# Patient Record
Sex: FEMALE | Race: WHITE | Employment: OTHER | URBAN - METROPOLITAN AREA
[De-identification: names, ages, dates, MRNs, and addresses within clinical notes are randomized per-mention and may not be internally consistent; named-entity substitution may affect disease eponyms.]

---

## 2019-09-26 ENCOUNTER — OFFICE VISIT (OUTPATIENT)
Dept: FAMILY MEDICINE CLINIC | Age: 74
End: 2019-09-26
Payer: COMMERCIAL

## 2019-09-26 VITALS
WEIGHT: 174 LBS | TEMPERATURE: 97.5 F | HEART RATE: 75 BPM | SYSTOLIC BLOOD PRESSURE: 132 MMHG | OXYGEN SATURATION: 96 % | BODY MASS INDEX: 28.99 KG/M2 | HEIGHT: 65 IN | DIASTOLIC BLOOD PRESSURE: 78 MMHG

## 2019-09-26 DIAGNOSIS — M65.30 TRIGGER FINGER OF RIGHT HAND, UNSPECIFIED FINGER: ICD-10-CM

## 2019-09-26 DIAGNOSIS — R42 LIGHTHEADEDNESS: ICD-10-CM

## 2019-09-26 DIAGNOSIS — Z00.00 PREVENTATIVE HEALTH CARE: ICD-10-CM

## 2019-09-26 DIAGNOSIS — J45.909 ASTHMA DUE TO SEASONAL ALLERGIES: ICD-10-CM

## 2019-09-26 DIAGNOSIS — R20.2 NUMBNESS AND TINGLING IN BOTH HANDS: Primary | ICD-10-CM

## 2019-09-26 DIAGNOSIS — R20.0 NUMBNESS AND TINGLING IN BOTH HANDS: Primary | ICD-10-CM

## 2019-09-26 DIAGNOSIS — M79.631 RIGHT FOREARM PAIN: ICD-10-CM

## 2019-09-26 PROCEDURE — 99204 OFFICE O/P NEW MOD 45 MIN: CPT | Performed by: NURSE PRACTITIONER

## 2019-09-26 RX ORDER — LORATADINE 10 MG/1
10 TABLET ORAL DAILY PRN
COMMUNITY

## 2019-09-26 RX ORDER — COVID-19 ANTIGEN TEST
220 KIT MISCELLANEOUS
COMMUNITY
End: 2019-09-26 | Stop reason: ALTCHOICE

## 2019-09-26 RX ORDER — BIOTIN 1 MG
1000 TABLET ORAL DAILY
COMMUNITY

## 2019-09-26 ASSESSMENT — PATIENT HEALTH QUESTIONNAIRE - PHQ9
2. FEELING DOWN, DEPRESSED OR HOPELESS: 0
1. LITTLE INTEREST OR PLEASURE IN DOING THINGS: 0
SUM OF ALL RESPONSES TO PHQ9 QUESTIONS 1 & 2: 0
SUM OF ALL RESPONSES TO PHQ QUESTIONS 1-9: 0
SUM OF ALL RESPONSES TO PHQ QUESTIONS 1-9: 0

## 2019-09-26 ASSESSMENT — ENCOUNTER SYMPTOMS
WHEEZING: 0
SHORTNESS OF BREATH: 0
COUGH: 1

## 2019-09-26 NOTE — PROGRESS NOTES
Oxana Mao. Prince Silva, APRN-CNP  [address] [facility]  [address] Lizeth Conquest  [address] [address]  Dept: [phone]  Dept Fax: [phone]    HPI:   Karl Medley is a 75 y.o. female who presents today for her medical conditions/complaintsas noted below. Karl Medley is c/o of New Patient; Numbness (Pt c/o rt hand index and middle finger pins and needle sensation with numbness. ); Dizziness (Pt states x 2 years when she gets hot she feels light-headed. ); and Other (Orthostatics LAY-144/76 SIT-142/80 CETMM-665/53)    HPI  Patient presents today to establish care with a new primary care provider. Previous primary care provider: Dr. Kelly Mendoza. Date last evaluated by former primary care provider: About a month ago. Need to request previous records:    Review of medical hx/current concerns:    HTN, lightheadedness: Was previously on medication- resolved with diet and exercise. She has noted when she is walking and is hot- then stops, she feels like she could pass out- first episode was 2 years ago. She does get dizzy when this occurs- she does feel that she could be dehydrated. Denies chest pain, palpitations, SOB. Dizziness episodes last for about one minute. Does happen frequently when she visits her son in [place]. Last dizziness episode was 1 week ago. States that her right index and middle finger (constant), as well as thumb is numb/tingling (comes and goes)- has had for many years. 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 Symptoms worsen when she is writing books. She does have a stabbing pain to right forearm, has noted over the past 1.5 months. Pain is improving, but still painful- has been picking up 3year old frequently. Pain does go down right wrist. Sometimes has s/s to left hand, but not as bad (right handed).      Past Medical History:   Diagnosis Date    Arthritis     Hypertension       Past Surgical History:   Procedure Value Date    WBC 4.1 07/11/2016    RBC 4.70 07/11/2016    HGB 13.4 07/11/2016    HCT 39.6 07/11/2016    MCV 84.4 07/11/2016    MCH 28.5 07/11/2016    MCHC 33.8 07/11/2016    RDW 14.8 07/11/2016     07/11/2016    MPV 8.7 07/11/2016     Lab Results   Component Value Date    TSH 2.18 09/20/2014     Lab Results   Component Value Date    CHOL 246 07/11/2016    HDL 66 07/11/2016     Assessment & Plan:      1. Numbness and tingling in both hands  -Suspicious for CTS, patient states she tried braces in the past which made s/s worse  -Labs and testing as follows:  - XR HAND RIGHT (MIN 3 VIEWS); Future  - XR RADIUS ULNA RIGHT (2 VIEWS); Future  - EMG; Future  - Vitamin B12; Future  -NSAID OTC PRN, patient declines rx pain medication  -Start stretches as provided in AVS    2. Trigger finger of right hand, unspecified finger  -Check X-ray  -Offered referral to hand specialist, patient declined at this time  - XR HAND RIGHT (MIN 3 VIEWS); Future    3. Right forearm pain  -Check x-ray  - XR RADIUS ULNA RIGHT (2 VIEWS); Future    4. Lightheadedness  -Orthostatics slightly remarkable  -Stay well hydrated  -Do not drive if dizzy  -Seek emergent tx for severe dizziness at any time  -Labs and testing as follows:  - CBC; Future  - Comprehensive Metabolic Panel; Future  - TSH with Reflex; Future  - EKG 12 Lead; Future  - Echocardiogram complete; Future  - Holter Monitor 48 Hour; Future  - US CAROTID ARTERY BILATERAL; Future    5. Asthma due to seasonal allergies  -Stable: Con't all medications as ordered, con't current tx plan- uses albuterol inhaler very rarely d/t cough    6. Preventative health care  -Labs as follows:  - CBC; Future  - Comprehensive Metabolic Panel; Future  - Hemoglobin A1C; Future  - Lipid Panel; Future    Return in about 1 month (around 10/26/2019) for lightheadedness, numbness/tingling. Discussed use, benefit, and side effects of prescribed medications. Barriers to medication compliance addressed.  All

## 2019-09-26 NOTE — PATIENT INSTRUCTIONS
[email or web address] and sign in to your [employer] account. Enter B278 in the [facility] box to learn more about \"Carpal Tunnel Syndrome: Exercises. \"     If you do not have an account, please click on the \"Sign Up Now\" link. Current as of: September 20, 2018  Content Version: 12.1  © 4554-9801 Healthwise, Incorporated. Care instructions adapted under license by [facility] ([facility]). If you have questions about a medical condition or this instruction, always ask your healthcare professional. [address] any warranty or liability for your use of this information.

## 2019-10-01 ENCOUNTER — HOSPITAL ENCOUNTER (OUTPATIENT)
Age: 74
Discharge: HOME | End: 2019-10-03
Payer: MEDICARE

## 2019-10-01 ENCOUNTER — HOSPITAL ENCOUNTER (OUTPATIENT)
Dept: GENERAL RADIOLOGY | Age: 74
Discharge: HOME | End: 2019-10-03
Payer: MEDICARE

## 2019-10-01 ENCOUNTER — HOSPITAL ENCOUNTER (OUTPATIENT)
Age: 74
Discharge: HOME | End: 2019-10-01
Payer: MEDICARE

## 2019-10-01 DIAGNOSIS — R42 LIGHTHEADEDNESS: ICD-10-CM

## 2019-10-01 DIAGNOSIS — R20.2 NUMBNESS AND TINGLING IN BOTH HANDS: ICD-10-CM

## 2019-10-01 DIAGNOSIS — R20.0 NUMBNESS AND TINGLING IN BOTH HANDS: ICD-10-CM

## 2019-10-01 DIAGNOSIS — M79.631 RIGHT FOREARM PAIN: ICD-10-CM

## 2019-10-01 DIAGNOSIS — Z00.00 PREVENTATIVE HEALTH CARE: ICD-10-CM

## 2019-10-01 DIAGNOSIS — M65.30 TRIGGER FINGER OF RIGHT HAND, UNSPECIFIED FINGER: ICD-10-CM

## 2019-10-01 LAB
ALBUMIN SERPL-MCNC: 4.2 G/DL (ref 3.5–5.2)
ALBUMIN/GLOBULIN RATIO: 1.4 (ref 1–2.5)
ALP BLD-CCNC: 82 U/L (ref 35–104)
ALT SERPL-CCNC: 18 U/L (ref 5–33)
ANION GAP SERPL CALCULATED.3IONS-SCNC: 12 MMOL/L (ref 9–17)
AST SERPL-CCNC: 18 U/L
BILIRUB SERPL-MCNC: 0.65 MG/DL (ref 0.3–1.2)
BUN BLDV-MCNC: 18 MG/DL (ref 8–23)
BUN/CREAT BLD: ABNORMAL (ref 9–20)
CALCIUM SERPL-MCNC: 9.4 MG/DL (ref 8.6–10.4)
CHLORIDE BLD-SCNC: 106 MMOL/L (ref 98–107)
CHOLESTEROL/HDL RATIO: 3.3
CHOLESTEROL: 222 MG/DL
CO2: 24 MMOL/L (ref 20–31)
CREAT SERPL-MCNC: 0.73 MG/DL (ref 0.5–0.9)
EKG ATRIAL RATE: 65 BPM
EKG P AXIS: 60 DEGREES
EKG P-R INTERVAL: 176 MS
EKG Q-T INTERVAL: 424 MS
EKG QRS DURATION: 98 MS
EKG QTC CALCULATION (BAZETT): 440 MS
EKG R AXIS: -5 DEGREES
EKG T AXIS: 23 DEGREES
EKG VENTRICULAR RATE: 65 BPM
ESTIMATED AVERAGE GLUCOSE: 111 MG/DL
GFR AFRICAN AMERICAN: >60 ML/MIN
GFR NON-AFRICAN AMERICAN: >60 ML/MIN
GFR SERPL CREATININE-BSD FRML MDRD: ABNORMAL ML/MIN/{1.73_M2}
GFR SERPL CREATININE-BSD FRML MDRD: ABNORMAL ML/MIN/{1.73_M2}
GLUCOSE BLD-MCNC: 106 MG/DL (ref 70–99)
HBA1C MFR BLD: 5.5 % (ref 4–6)
HCT VFR BLD CALC: 43 % (ref 36.3–47.1)
HDLC SERPL-MCNC: 68 MG/DL
HEMOGLOBIN: 14 G/DL (ref 11.9–15.1)
LDL CHOLESTEROL: 126 MG/DL (ref 0–130)
MCH RBC QN AUTO: 28.8 PG (ref 25.2–33.5)
MCHC RBC AUTO-ENTMCNC: 32.6 G/DL (ref 28.4–34.8)
MCV RBC AUTO: 88.5 FL (ref 82.6–102.9)
NRBC AUTOMATED: 0 PER 100 WBC
PDW BLD-RTO: 14.2 % (ref 11.8–14.4)
PLATELET # BLD: 214 K/UL (ref 138–453)
PMV BLD AUTO: 10.9 FL (ref 8.1–13.5)
POTASSIUM SERPL-SCNC: 4.2 MMOL/L (ref 3.7–5.3)
RBC # BLD: 4.86 M/UL (ref 3.95–5.11)
SODIUM BLD-SCNC: 142 MMOL/L (ref 135–144)
TOTAL PROTEIN: 7.2 G/DL (ref 6.4–8.3)
TRIGL SERPL-MCNC: 142 MG/DL
TSH SERPL DL<=0.05 MIU/L-ACNC: 2.98 MIU/L (ref 0.3–5)
VITAMIN B-12: 400 PG/ML (ref 232–1245)
VLDLC SERPL CALC-MCNC: ABNORMAL MG/DL (ref 1–30)
WBC # BLD: 4.4 K/UL (ref 3.5–11.3)

## 2019-10-01 PROCEDURE — 73130 X-RAY EXAM OF HAND: CPT

## 2019-10-01 PROCEDURE — 85027 COMPLETE CBC AUTOMATED: CPT

## 2019-10-01 PROCEDURE — 84443 ASSAY THYROID STIM HORMONE: CPT

## 2019-10-01 PROCEDURE — 82607 VITAMIN B-12: CPT

## 2019-10-01 PROCEDURE — 93005 ELECTROCARDIOGRAM TRACING: CPT | Performed by: NURSE PRACTITIONER

## 2019-10-01 PROCEDURE — 80053 COMPREHEN METABOLIC PANEL: CPT

## 2019-10-01 PROCEDURE — 73090 X-RAY EXAM OF FOREARM: CPT

## 2019-10-01 PROCEDURE — 83036 HEMOGLOBIN GLYCOSYLATED A1C: CPT

## 2019-10-01 PROCEDURE — 80061 LIPID PANEL: CPT

## 2019-10-01 PROCEDURE — 36415 COLL VENOUS BLD VENIPUNCTURE: CPT

## 2019-10-17 ENCOUNTER — HOSPITAL ENCOUNTER (OUTPATIENT)
Dept: NON INVASIVE DIAGNOSTICS | Age: 74
Discharge: HOME | End: 2019-10-17
Payer: MEDICARE

## 2019-10-17 ENCOUNTER — HOSPITAL ENCOUNTER (OUTPATIENT)
Dept: VASCULAR LAB | Age: 74
Discharge: HOME | End: 2019-10-17
Payer: MEDICARE

## 2019-10-17 DIAGNOSIS — R42 LIGHTHEADEDNESS: ICD-10-CM

## 2019-10-17 DIAGNOSIS — R00.0 TACHYCARDIA: Primary | ICD-10-CM

## 2019-10-17 LAB
LV EF: 65 %
LVEF MODALITY: NORMAL

## 2019-10-17 PROCEDURE — 93880 EXTRACRANIAL BILAT STUDY: CPT

## 2019-10-17 PROCEDURE — 93226 XTRNL ECG REC<48 HR SCAN A/R: CPT

## 2019-10-17 PROCEDURE — 93225 XTRNL ECG REC<48 HRS REC: CPT

## 2019-10-17 PROCEDURE — 93306 TTE W/DOPPLER COMPLETE: CPT

## 2019-10-18 PROBLEM — I65.23 BILATERAL CAROTID ARTERY STENOSIS: Status: ACTIVE | Noted: 2019-10-18

## 2019-10-24 LAB
ACQUISITION DURATION: NORMAL S
AVERAGE HEART RATE: 74 BPM
HOOKUP DATE: NORMAL
HOOKUP TIME: NORMAL
MAX HEART RATE TIME/DATE: NORMAL
MAX HEART RATE: 111 BPM
MIN HEART RATE TIME/DATE: NORMAL
MIN HEART RATE: 51 BPM
NUMBER OF QRS COMPLEXES: NORMAL
NUMBER OF SUPRAVENTRICULAR BEATS IN RUNS: 0
NUMBER OF SUPRAVENTRICULAR COUPLETS: 0
NUMBER OF SUPRAVENTRICULAR ECTOPICS: 366
NUMBER OF SUPRAVENTRICULAR ISOLATED BEATS: 366
NUMBER OF SUPRAVENTRICULAR RUNS: 0
NUMBER OF VENTRICULAR BEATS IN RUNS: 0
NUMBER OF VENTRICULAR BIGEMINAL CYCLES: 0
NUMBER OF VENTRICULAR COUPLETS: 0
NUMBER OF VENTRICULAR ECTOPICS: 4
NUMBER OF VENTRICULAR ISOLATED BEATS: 4
NUMBER OF VENTRICULAR RUNS: 0

## 2019-11-15 ENCOUNTER — HOSPITAL ENCOUNTER (OUTPATIENT)
Age: 74
Discharge: HOME | End: 2019-11-17
Payer: MEDICARE

## 2019-11-15 ENCOUNTER — HOSPITAL ENCOUNTER (OUTPATIENT)
Dept: GENERAL RADIOLOGY | Age: 74
Discharge: HOME | End: 2019-11-17
Payer: MEDICARE

## 2019-11-15 ENCOUNTER — OFFICE VISIT (OUTPATIENT)
Dept: FAMILY MEDICINE CLINIC | Age: 74
End: 2019-11-15
Payer: MEDICARE

## 2019-11-15 VITALS
RESPIRATION RATE: 16 BRPM | BODY MASS INDEX: 29.01 KG/M2 | WEIGHT: 173 LBS | SYSTOLIC BLOOD PRESSURE: 136 MMHG | OXYGEN SATURATION: 95 % | TEMPERATURE: 97.2 F | HEART RATE: 82 BPM | DIASTOLIC BLOOD PRESSURE: 80 MMHG

## 2019-11-15 DIAGNOSIS — J01.90 ACUTE BACTERIAL SINUSITIS: ICD-10-CM

## 2019-11-15 DIAGNOSIS — B96.89 ACUTE BACTERIAL SINUSITIS: ICD-10-CM

## 2019-11-15 DIAGNOSIS — J20.9 ACUTE BRONCHITIS, UNSPECIFIED ORGANISM: Primary | ICD-10-CM

## 2019-11-15 DIAGNOSIS — J20.9 ACUTE BRONCHITIS, UNSPECIFIED ORGANISM: ICD-10-CM

## 2019-11-15 PROCEDURE — 71046 X-RAY EXAM CHEST 2 VIEWS: CPT

## 2019-11-15 PROCEDURE — 99214 OFFICE O/P EST MOD 30 MIN: CPT | Performed by: NURSE PRACTITIONER

## 2019-11-15 RX ORDER — PREDNISONE 10 MG/1
TABLET ORAL
Qty: 18 TABLET | Refills: 0 | Status: SHIPPED | OUTPATIENT
Start: 2019-11-15 | End: 2019-11-24

## 2019-11-15 RX ORDER — BENZONATATE 100 MG/1
100 CAPSULE ORAL 3 TIMES DAILY PRN
Qty: 30 CAPSULE | Refills: 0 | Status: SHIPPED | OUTPATIENT
Start: 2019-11-15 | End: 2019-12-04 | Stop reason: SDUPTHER

## 2019-11-15 RX ORDER — AZITHROMYCIN 250 MG/1
TABLET, FILM COATED ORAL
Qty: 6 TABLET | Refills: 0 | Status: SHIPPED | OUTPATIENT
Start: 2019-11-15 | End: 2019-11-19

## 2019-11-15 ASSESSMENT — ENCOUNTER SYMPTOMS
SORE THROAT: 0
RHINORRHEA: 1
SINUS PRESSURE: 1
WHEEZING: 1
SHORTNESS OF BREATH: 1
COUGH: 1
SINUS PAIN: 1

## 2019-12-04 ENCOUNTER — OFFICE VISIT (OUTPATIENT)
Dept: FAMILY MEDICINE CLINIC | Age: 74
End: 2019-12-04
Payer: MEDICARE

## 2019-12-04 VITALS
TEMPERATURE: 98.3 F | DIASTOLIC BLOOD PRESSURE: 74 MMHG | HEART RATE: 84 BPM | SYSTOLIC BLOOD PRESSURE: 138 MMHG | BODY MASS INDEX: 29.35 KG/M2 | OXYGEN SATURATION: 95 % | RESPIRATION RATE: 16 BRPM | WEIGHT: 175 LBS

## 2019-12-04 DIAGNOSIS — R42 LIGHTHEADEDNESS: ICD-10-CM

## 2019-12-04 DIAGNOSIS — F43.21 SITUATIONAL DEPRESSION: ICD-10-CM

## 2019-12-04 DIAGNOSIS — K92.1 BLOOD IN STOOL: ICD-10-CM

## 2019-12-04 DIAGNOSIS — R20.0 NUMBNESS AND TINGLING IN BOTH HANDS: ICD-10-CM

## 2019-12-04 DIAGNOSIS — M79.631 RIGHT FOREARM PAIN: ICD-10-CM

## 2019-12-04 DIAGNOSIS — J45.909 ASTHMA DUE TO SEASONAL ALLERGIES: ICD-10-CM

## 2019-12-04 DIAGNOSIS — G47.30 SLEEP APNEA, UNSPECIFIED TYPE: ICD-10-CM

## 2019-12-04 DIAGNOSIS — R20.2 NUMBNESS AND TINGLING IN BOTH HANDS: ICD-10-CM

## 2019-12-04 DIAGNOSIS — M65.359 TRIGGER LITTLE FINGER, UNSPECIFIED LATERALITY: Primary | ICD-10-CM

## 2019-12-04 DIAGNOSIS — R05.3 PERSISTENT COUGH: ICD-10-CM

## 2019-12-04 DIAGNOSIS — I51.7 LVH (LEFT VENTRICULAR HYPERTROPHY): ICD-10-CM

## 2019-12-04 DIAGNOSIS — R15.9 INCONTINENCE OF FECES, UNSPECIFIED FECAL INCONTINENCE TYPE: ICD-10-CM

## 2019-12-04 PROCEDURE — 99214 OFFICE O/P EST MOD 30 MIN: CPT | Performed by: NURSE PRACTITIONER

## 2019-12-04 RX ORDER — BENZONATATE 100 MG/1
100 CAPSULE ORAL 3 TIMES DAILY PRN
Qty: 30 CAPSULE | Refills: 0 | Status: SHIPPED | OUTPATIENT
Start: 2019-12-04 | End: 2019-12-14

## 2019-12-04 ASSESSMENT — ENCOUNTER SYMPTOMS
WHEEZING: 0
SINUS PAIN: 0
SINUS PRESSURE: 0
SORE THROAT: 0
SHORTNESS OF BREATH: 0
RECTAL PAIN: 0
RHINORRHEA: 1
COUGH: 1
ABDOMINAL PAIN: 0
BLOOD IN STOOL: 1

## 2019-12-05 ENCOUNTER — TELEPHONE (OUTPATIENT)
Dept: FAMILY MEDICINE CLINIC | Age: 74
End: 2019-12-05

## 2019-12-11 ENCOUNTER — TELEPHONE (OUTPATIENT)
Dept: GASTROENTEROLOGY | Age: 74
End: 2019-12-11

## 2019-12-13 ENCOUNTER — HOSPITAL ENCOUNTER (OUTPATIENT)
Dept: GENERAL RADIOLOGY | Age: 74
Discharge: HOME | End: 2019-12-15
Payer: MEDICARE

## 2019-12-13 ENCOUNTER — HOSPITAL ENCOUNTER (OUTPATIENT)
Age: 74
Discharge: HOME | End: 2019-12-15
Payer: MEDICARE

## 2019-12-13 DIAGNOSIS — R15.9 INCONTINENCE OF FECES, UNSPECIFIED FECAL INCONTINENCE TYPE: ICD-10-CM

## 2019-12-13 DIAGNOSIS — R05.3 PERSISTENT COUGH: ICD-10-CM

## 2019-12-13 DIAGNOSIS — J45.909 ASTHMA DUE TO SEASONAL ALLERGIES: ICD-10-CM

## 2019-12-13 PROBLEM — M51.36 LUMBAR DEGENERATIVE DISC DISEASE: Status: ACTIVE | Noted: 2019-12-13

## 2019-12-13 PROBLEM — M51.369 LUMBAR DEGENERATIVE DISC DISEASE: Status: ACTIVE | Noted: 2019-12-13

## 2019-12-13 PROCEDURE — 72100 X-RAY EXAM L-S SPINE 2/3 VWS: CPT

## 2020-01-08 ENCOUNTER — HOSPITAL ENCOUNTER (OUTPATIENT)
Age: 75
Discharge: HOME | End: 2020-01-08
Payer: MEDICARE

## 2020-01-08 ENCOUNTER — OFFICE VISIT (OUTPATIENT)
Dept: FAMILY MEDICINE CLINIC | Age: 75
End: 2020-01-08
Payer: MEDICARE

## 2020-01-08 VITALS
HEIGHT: 64 IN | RESPIRATION RATE: 16 BRPM | DIASTOLIC BLOOD PRESSURE: 70 MMHG | WEIGHT: 176 LBS | SYSTOLIC BLOOD PRESSURE: 138 MMHG | TEMPERATURE: 97 F | OXYGEN SATURATION: 95 % | HEART RATE: 73 BPM | BODY MASS INDEX: 30.05 KG/M2

## 2020-01-08 PROBLEM — M25.552 LEFT HIP PAIN: Status: ACTIVE | Noted: 2020-01-08

## 2020-01-08 PROBLEM — Z80.49 FAMILY HISTORY OF MALIGNANT NEOPLASM OF ENDOMETRIUM: Status: ACTIVE | Noted: 2020-01-08

## 2020-01-08 PROBLEM — E78.5 HYPERLIPIDEMIA: Status: ACTIVE | Noted: 2020-01-08

## 2020-01-08 LAB
CHOLESTEROL/HDL RATIO: 3.2
CHOLESTEROL: 213 MG/DL
HCT VFR BLD CALC: 43.5 % (ref 36.3–47.1)
HDLC SERPL-MCNC: 66 MG/DL
HEMOGLOBIN: 13.9 G/DL (ref 11.9–15.1)
LDL CHOLESTEROL: 127 MG/DL (ref 0–130)
MCH RBC QN AUTO: 28.5 PG (ref 25.2–33.5)
MCHC RBC AUTO-ENTMCNC: 32 G/DL (ref 28.4–34.8)
MCV RBC AUTO: 89.1 FL (ref 82.6–102.9)
NRBC AUTOMATED: 0 PER 100 WBC
PDW BLD-RTO: 14.2 % (ref 11.8–14.4)
PLATELET # BLD: 218 K/UL (ref 138–453)
PMV BLD AUTO: 10.7 FL (ref 8.1–13.5)
RBC # BLD: 4.88 M/UL (ref 3.95–5.11)
TRIGL SERPL-MCNC: 102 MG/DL
VLDLC SERPL CALC-MCNC: ABNORMAL MG/DL (ref 1–30)
WBC # BLD: 6.3 K/UL (ref 3.5–11.3)

## 2020-01-08 PROCEDURE — 36415 COLL VENOUS BLD VENIPUNCTURE: CPT

## 2020-01-08 PROCEDURE — 85027 COMPLETE CBC AUTOMATED: CPT

## 2020-01-08 PROCEDURE — 80061 LIPID PANEL: CPT

## 2020-01-08 PROCEDURE — G0439 PPPS, SUBSEQ VISIT: HCPCS | Performed by: NURSE PRACTITIONER

## 2020-01-08 ASSESSMENT — PATIENT HEALTH QUESTIONNAIRE - PHQ9
SUM OF ALL RESPONSES TO PHQ QUESTIONS 1-9: 0
SUM OF ALL RESPONSES TO PHQ QUESTIONS 1-9: 0

## 2020-01-08 ASSESSMENT — LIFESTYLE VARIABLES
HOW OFTEN DURING THE LAST YEAR HAVE YOU NEEDED AN ALCOHOLIC DRINK FIRST THING IN THE MORNING TO GET YOURSELF GOING AFTER A NIGHT OF HEAVY DRINKING: 0
HOW OFTEN DURING THE LAST YEAR HAVE YOU HAD A FEELING OF GUILT OR REMORSE AFTER DRINKING: 0
HOW OFTEN DURING THE LAST YEAR HAVE YOU FAILED TO DO WHAT WAS NORMALLY EXPECTED FROM YOU BECAUSE OF DRINKING: 0
HOW OFTEN DO YOU HAVE A DRINK CONTAINING ALCOHOL: 2
HOW OFTEN DO YOU HAVE SIX OR MORE DRINKS ON ONE OCCASION: 0
HOW MANY STANDARD DRINKS CONTAINING ALCOHOL DO YOU HAVE ON A TYPICAL DAY: 0
AUDIT-C TOTAL SCORE: 2
AUDIT TOTAL SCORE: 2
HAS A RELATIVE, FRIEND, DOCTOR, OR ANOTHER HEALTH PROFESSIONAL EXPRESSED CONCERN ABOUT YOUR DRINKING OR SUGGESTED YOU CUT DOWN: 0
HOW OFTEN DURING THE LAST YEAR HAVE YOU BEEN UNABLE TO REMEMBER WHAT HAPPENED THE NIGHT BEFORE BECAUSE YOU HAD BEEN DRINKING: 0
HOW OFTEN DURING THE LAST YEAR HAVE YOU FOUND THAT YOU WERE NOT ABLE TO STOP DRINKING ONCE YOU HAD STARTED: 0
HAVE YOU OR SOMEONE ELSE BEEN INJURED AS A RESULT OF YOUR DRINKING: 0

## 2020-01-08 ASSESSMENT — ENCOUNTER SYMPTOMS
COUGH: 1
BLOOD IN STOOL: 0
BACK PAIN: 1

## 2020-01-08 NOTE — PATIENT INSTRUCTIONS
exercises may be suggested for a condition or for rehabilitation. Start each exercise slowly. Ease off the exercises if you start to have pain. You will be told when to start these exercises and which ones will work best for you. How to do the exercises  Straight-leg raises to the outside   Lie on your side, with your affected hip on top. Tighten the front thigh muscles of your top leg to keep your knee straight. Keep your hip and your leg straight in line with the rest of your body, and keep your knee pointing forward. Do not drop your hip back. Lift your top leg straight up toward the ceiling, about 12 inches off the floor. Hold for about 6 seconds, then slowly lower your leg. Repeat 8 to 12 times. Switch legs and repeat steps 1 through 5, even if only one hip is sore. Straight-leg raises to the inside   Lie on your side with your affected hip on the floor. You can either prop up your other leg on a chair, or you can bend that knee and put that foot in front of your other knee. Do not drop your hip back. Tighten the muscles on the front thigh of your bottom leg to straighten that knee. Keep your kneecap pointing forward and your leg straight, and lift your bottom leg up toward the ceiling about 6 inches. Hold for about 6 seconds, then lower slowly. Repeat 8 to 12 times. Switch legs and repeat steps 1 through 5, even if only one hip is sore. Hip hike   Stand sideways on the bottom step of a staircase, and hold on to the banister or wall. Keeping both knees straight, lift your good leg off the step and let it hang down. Then hike your good hip up to the same level as your affected hip or a little higher. Repeat 8 to 12 times. Switch legs and repeat steps 1 through 3, even if only one hip is sore. Bridging   Lie on your back with both knees bent. Your knees should be bent about 90 degrees.   Then push your feet into the floor, squeeze your buttocks, and lift your hips off the floor until your shoulders, hips, and knees are all in a straight line. Hold for about 6 seconds as you continue to breathe normally, and then slowly lower your hips back down to the floor and rest for up to 10 seconds. Repeat 8 to 12 times. Hamstring stretch (lying down)   Lie flat on your back with your legs straight. If you feel discomfort in your back, place a small towel roll under your lower back. Holding the back of your affected leg, lift your leg straight up and toward your body until you feel a stretch at the back of your thigh. Hold the stretch for at least 30 seconds. Repeat 2 to 4 times. Switch legs and repeat steps 1 through 4, even if only one hip is sore. Standing quadriceps stretch   If you are not steady on your feet, hold on to a chair, counter, or wall. You can also lie on your stomach or your side to do this exercise. Bend the knee of the leg you want to stretch, and reach behind you to grab the front of your foot or ankle with the hand on the same side. For example, if you are stretching your right leg, use your right hand. Keeping your knees next to each other, pull your foot toward your buttock until you feel a gentle stretch across the front of your hip and down the front of your thigh. Your knee should be pointed directly to the ground, and not out to the side. Hold the stretch for at least 15 to 30 seconds. Repeat 2 to 4 times. Switch legs and repeat steps 1 through 5, even if only one hip is sore. Hip rotator stretch   Lie on your back with both knees bent and your feet flat on the floor. Put the ankle of your affected leg on your opposite thigh near your knee. Use your hand to gently push your knee away from your body until you feel a gentle stretch around your hip. Hold the stretch for 15 to 30 seconds. Repeat 2 to 4 times. Repeat steps 1 through 5, but this time use your hand to gently pull your knee toward your opposite shoulder.   Switch legs and repeat steps 1 through 6, even if only one hip is sore. Knee-to-chest   Lie on your back with your knees bent and your feet flat on the floor. Bring your affected leg to your chest, keeping the other foot flat on the floor (or keeping the other leg straight, whichever feels better on your lower back). Keep your lower back pressed to the floor. Hold for at least 15 to 30 seconds. Relax, and lower the knee to the starting position. Repeat 2 to 4 times. Switch legs and repeat steps 1 through 5, even if only one hip is sore. To get more stretch, put your other leg flat on the floor while pulling your knee to your chest.    Clamshell   Lie on your side, with your affected hip on top. Keep your feet and knees together and your knees bent. Raise your top knee, but keep your feet together. Do not let your hips roll back. Your legs should open up like a clamshell. Hold for 6 seconds. Slowly lower your knee back down. Rest for 10 seconds. Repeat 8 to 12 times. Switch legs and repeat steps 1 through 5, even if only one hip is sore. Follow-up care is a key part of your treatment and safety. Be sure to make and go to all appointments, and call your doctor if you are having problems. It's also a good idea to know your test results and keep a list of the medicines you take. Where can you learn more? Go to [email or web address] and sign in to your [employer] account. Enter G502 in the [employer] box to learn more about \"Hip Arthritis: Exercises. \"     If you do not have an account, please click on the \"Sign Up Now\" link. Current as of: June 26, 2019  Content Version: 12.3  © 8924-8781 Healthwise, Incorporated. Care instructions adapted under license by [facility] ([facility]). If you have questions about a medical condition or this instruction, always ask your healthcare professional. [address] any warranty or liability for your use of this information.

## 2020-01-08 NOTE — PROGRESS NOTES
Medicare Annual Wellness Visit  Name: Tamiko Bautista Date: 2020   MRN: [id number] Sex: Female   Age: 75 y.o. Ethnicity: Non-/Non    : 1945 Race: Mariano Snow is here for Medicare AWV    Screenings for behavioral, psychosocial and functional/safety risks, and cognitive dysfunction are all negative except as indicated below. These results, as well as other patient data from the [address] form, are documented in Flowsheets linked to this Encounter. Recently, a sister passed away at 67 of endometrial CA and her other sister who is 77- recently dx'd with endometrial CA. She is still experiencing periodic bowel incontinence, but does deny any severe lower back pain, numbness/tingling to b/l LE's. No recent blood in stool. She does note some pain to left hip- started about a month ago, pain comes and goes. Rating pain a 3-4 on 0-10 pain scale. She is still experiencing numbness tingling to right thumb, index, and ring finger- she did just to complete writing a book, and feels that this may be related to this issue. She had not had any dizziness for a long time, then had an episode yesterday, was in the grocery store, got hot, slightly lightheaded- had not drank very much, she had eaten- denies syncope. Allergies   Allergen Reactions    Oxycodone          Prior to Visit Medications    Medication Sig Taking? Authorizing Provider   mometasone-formoterol (DULERA) 100-5 MCG/ACT inhaler Inhale 2 puffs into the lungs 2 times daily Rinse mouth after using.  Yes ARIEL Cox CNP   PROAIR  (90 Base) MCG/ACT inhaler Inhale 2 puffs into the lungs every 4-6 hours as needed for Wheezing or Shortness of Breath Yes ARIEL Cox CNP   loratadine (CLARITIN) 10 MG tablet Take 10 mg by mouth daily as needed (Seasonal allergies)  Yes Historical Provider, MD   Biotin 1000 MCG TABS Take 1,000 mcg by mouth daily  Yes Historical Provider, MD         Past Medical History:   Diagnosis Date    Arthritis     Hypertension        Past Surgical History:   Procedure Laterality Date    CERVICAL FUSION      DILATION AND CURETTAGE OF UTERUS  09/05/2019    JOINT REPLACEMENT Right 2013         Family History   Problem Relation Age of Onset    Stroke Mother     Heart Attack Father     Endometrial Cancer Sister         x2 sisters dx'd with endometrial CA     Review of Systems   Constitutional: Positive for fatigue. Negative for chills and fever. HENT: Negative. Respiratory: Positive for cough (Intermittent). Gastrointestinal: Negative for blood in stool (None since last OV). Musculoskeletal: Positive for arthralgias (Left hip pain) and back pain. Neurological: Positive for dizziness and numbness. Negative for syncope. Psychiatric/Behavioral: Negative for dysphoric mood. CareTeam (Including outside providers/suppliers regularly involved in providing care):   Patient Care Team:  ARIEL Johnson CNP as PCP - General (Nurse Practitioner Family)  ARIEL Johnson CNP as PCP - [facility] Empaneled Provider    Wt Readings from Last 3 Encounters:   01/08/20 176 lb (79.8 kg)   12/04/19 175 lb (79.4 kg)   11/15/19 173 lb (78.5 kg)     Vitals:    01/08/20 0925   BP: 138/70   Pulse: 73   Resp: 16   Temp: 97 °F (36.1 °C)   SpO2: 95%   Weight: 176 lb (79.8 kg)   Height: 5' 4.37\" (1.635 m)     Body mass index is 29.86 kg/m². Based upon direct observation of the patient, evaluation of cognition reveals recent and remote memory intact.     General Appearance: alert and oriented to person, place and time, well developed and well- nourished, in no acute distress  Skin: warm and dry, no rash or erythema  Head: normocephalic and atraumatic  Eyes: pupils equal, round, and reactive to light, extraocular eye movements intact, conjunctivae normal  ENT: tympanic membrane, external ear and ear canal normal bilaterally, nose without deformity, Yes  Have you seen a dentist within the past year?: Yes  Body mass index is 29.86 kg/m². Health Habits/Nutrition Interventions:  · Nutritional issues:  Sometimes she forgets to eat a full 3 meals/day- states she has always had a tendency to do this    Personalized Preventive Plan   Current Health Maintenance Status  Immunization History   Administered Date(s) Administered    Hepatitis A Adult (Havrix, Vaqta) 11/18/2019    Influenza Vaccine, unspecified formulation 10/14/2013, 12/20/2016    Influenza, High Dose (Fluzone 65 yrs and older) 12/02/2014, 11/16/2015, 10/02/2017, 11/06/2019    Influenza, Triv, inactivated, subunit, adjuvanted, IM (Fluad 65 yrs and older) 10/05/2018    Pneumococcal Conjugate 13-valent (Ozfpfzh30) 10/02/2017, 10/05/2018    Pneumococcal Polysaccharide ([id number]) 10/12/2018    Pneumococcal Vaccine 10/02/2017    Tdap (Boostrix, Adacel) 10/12/2018, 11/18/2019    Zoster Recombinant (Shingrix) 04/19/2019, 11/06/2019        Health Maintenance   Topic Date Due    Colon cancer screen colonoscopy  11/05/1994    DEXA (modify frequency per FRAX score)  11/05/2009    Annual Wellness Visit (AWV)  10/01/2019    Lipid screen  10/01/2024    DTaP/Tdap/Td vaccine (3 - Td) 11/18/2029    Flu vaccine  Completed    Shingles Vaccine  Completed    Pneumococcal 65+ years Vaccine  Completed     1. Routine general medical examination at a health care facility  -See notes above    2. Lightheadedness  -Stable: Con't all medications as ordered, con't current tx plan  -Stay well hydrated  -Do not drive if dizzy  -Seek emergent tx for severe dizziness at any time  -Will refer for further evaluation and possible treatment:  - [facility]) - Vanessa Salinas MD, Cardiology, [place]    3. Incontinence of feces, unspecified fecal incontinence type  4.  Blood in stool  -Blood in stool resolved  -I do not believe r/t Cauda Equina Syndrome- will hold off on lumbar MRI at this time, as patient has no lower

## 2020-01-09 RX ORDER — ATORVASTATIN CALCIUM 20 MG/1
20 TABLET, FILM COATED ORAL DAILY
Qty: 90 TABLET | Refills: 1 | Status: SHIPPED | OUTPATIENT
Start: 2020-01-09 | End: 2020-09-09

## 2020-01-16 ENCOUNTER — TELEPHONE (OUTPATIENT)
Dept: FAMILY MEDICINE CLINIC | Age: 75
End: 2020-01-16

## 2020-01-16 NOTE — TELEPHONE ENCOUNTER
Has she tried over the counter Tylenol arthritis yet? I can also send her prescription strength naproxen, let me know.

## 2020-01-20 ENCOUNTER — OFFICE VISIT (OUTPATIENT)
Dept: GASTROENTEROLOGY | Age: 75
End: 2020-01-20
Payer: MEDICARE

## 2020-01-20 VITALS
DIASTOLIC BLOOD PRESSURE: 92 MMHG | SYSTOLIC BLOOD PRESSURE: 164 MMHG | BODY MASS INDEX: 30.39 KG/M2 | HEART RATE: 81 BPM | WEIGHT: 179.1 LBS

## 2020-01-20 PROCEDURE — 99204 OFFICE O/P NEW MOD 45 MIN: CPT | Performed by: INTERNAL MEDICINE

## 2020-01-20 RX ORDER — SIMETHICONE 80 MG
80 TABLET,CHEWABLE ORAL 4 TIMES DAILY PRN
Qty: 180 TABLET | Refills: 3 | Status: SHIPPED | OUTPATIENT
Start: 2020-01-20 | End: 2020-12-18

## 2020-01-20 ASSESSMENT — ENCOUNTER SYMPTOMS
ABDOMINAL PAIN: 1
DIARRHEA: 1
ANAL BLEEDING: 1
ABDOMINAL DISTENTION: 1

## 2020-01-20 NOTE — PROGRESS NOTES
100-5 MCG/ACT inhaler, Inhale 2 puffs into the lungs 2 times daily Rinse mouth after using., Disp: 1 Inhaler, Rfl: 1    PROAIR  (90 Base) MCG/ACT inhaler, Inhale 2 puffs into the lungs every 4-6 hours as needed for Wheezing or Shortness of Breath, Disp: 1 Inhaler, Rfl: 0    loratadine (CLARITIN) 10 MG tablet, Take 10 mg by mouth daily as needed (Seasonal allergies) , Disp: , Rfl:     Biotin 1000 MCG TABS, Take 1,000 mcg by mouth daily , Disp: , Rfl:     ALLERGIES:   Allergies   Allergen Reactions    Oxycodone        FAMILY HISTORY:       Problem Relation Age of Onset    Stroke Mother     Heart Attack Father     Endometrial Cancer Sister         x2 sisters dx'd with endometrial CA    Colon Cancer Sister     Colon Cancer Sister          SOCIAL HISTORY:   Social History     Socioeconomic History    Marital status:      Spouse name: Not on file    Number of children: Not on file    Years of education: Not on file    Highest education level: Not on file   Occupational History    Not on file   Social Needs    Financial resource strain: Not on file    Food insecurity:     Worry: Not on file     Inability: Not on file    Transportation needs:     Medical: Not on file     Non-medical: Not on file   Tobacco Use    Smoking status: Never Smoker    Smokeless tobacco: Never Used   Substance and Sexual Activity    Alcohol use: Yes     Comment: 2 x monthly    Drug use: No    Sexual activity: Not Currently   Lifestyle    Physical activity:     Days per week: Not on file     Minutes per session: Not on file    Stress: Not on file   Relationships    Social connections:     Talks on phone: Not on file     Gets together: Not on file     Attends Jehovah's witness service: Not on file     Active member of club or organization: Not on file     Attends meetings of clubs or organizations: Not on file     Relationship status: Not on file    Intimate partner violence:     Fear of current or ex partner: Not on file     Emotionally abused: Not on file     Physically abused: Not on file     Forced sexual activity: Not on file   Other Topics Concern    Not on file   Social History Narrative    Not on file         REVIEW OF SYSTEMS: A 12-point review of systems was obtained and pertinent positives and negatives were listed below. REVIEW OF SYSTEMS:     Constitutional: No fever, no chills, no lethargy, no weakness. HEENT:  No headache, otalgia, itchy eyes, nasal discharge or sore throat. Cardiac:  No chest pain, dyspnea, orthopnea or PND. Chest:   No cough, phlegm or wheezing. Abdomen:      Detailed by MA   Neuro:  No focal weakness, abnormal movements or seizure like activity. Skin:   No rashes, no itching. :   No hematuria, no pyuria, no dysuria, no flank pain. Extremities:  No swelling or joint pains. ROS was otherwise negative    Review of Systems    PHYSICAL EXAMINATION: Vital signs reviewed per the nursing documentation. BP (!) 164/92   Pulse 81   Wt 179 lb 1.6 oz (81.2 kg)   BMI 30.39 kg/m²   Body mass index is 30.39 kg/m². Physical Exam    Physical Exam   Constitutional: Patient is oriented to person, place, and time. Patient appears well-developed and well-nourished. HENT:   Head: Normocephalic and atraumatic. Eyes: Pupils are equal, round, and reactive to light. EOM are normal.   Neck: Normal range of motion. Neck supple. No JVD present. No tracheal deviation present. No thyromegaly present. Cardiovascular: Normal rate, regular rhythm, normal heart sounds and intact distal pulses. Pulmonary/Chest: Effort normal and breath sounds normal. No stridor. No respiratory distress. He has no wheezes. He has no rales. He exhibits no tenderness. Abdominal: Soft. Bowel sounds are normal. He exhibits no distension and no mass. There is no tenderness. There is no rebound and no guarding. No hernia. Musculoskeletal: Normal range of motion.    Lymphadenopathy:    Patient has no cervical Sayra. For any further questions please do not hesitate to contact me. I have reviewed and agree with the MA/SYLVESTERN ROS.      Prachi Peterson MD, MPH   [facility] Gastroenterology  Office #: [phone]

## 2020-01-21 ENCOUNTER — HOSPITAL ENCOUNTER (OUTPATIENT)
Age: 75
Discharge: HOME | End: 2020-01-21
Payer: MEDICARE

## 2020-01-21 LAB
ABSOLUTE EOS #: 0.08 K/UL (ref 0–0.44)
ABSOLUTE IMMATURE GRANULOCYTE: <0.03 K/UL (ref 0–0.3)
ABSOLUTE LYMPH #: 1.6 K/UL (ref 1.1–3.7)
ABSOLUTE MONO #: 0.39 K/UL (ref 0.1–1.2)
ALBUMIN SERPL-MCNC: 4.1 G/DL (ref 3.5–5.2)
ALBUMIN SERPL-MCNC: 4.1 G/DL (ref 3.5–5.2)
ALBUMIN/GLOBULIN RATIO: 1.5 (ref 1–2.5)
ALBUMIN/GLOBULIN RATIO: 1.5 (ref 1–2.5)
ALP BLD-CCNC: 83 U/L (ref 35–104)
ALP BLD-CCNC: 83 U/L (ref 35–104)
ALT SERPL-CCNC: 19 U/L (ref 5–33)
ALT SERPL-CCNC: 19 U/L (ref 5–33)
ANION GAP SERPL CALCULATED.3IONS-SCNC: 8 MMOL/L (ref 9–17)
AST SERPL-CCNC: 20 U/L
AST SERPL-CCNC: 20 U/L
BASOPHILS # BLD: 1 % (ref 0–2)
BASOPHILS ABSOLUTE: 0.03 K/UL (ref 0–0.2)
BILIRUB SERPL-MCNC: 0.72 MG/DL (ref 0.3–1.2)
BILIRUB SERPL-MCNC: 0.72 MG/DL (ref 0.3–1.2)
BILIRUBIN DIRECT: 0.17 MG/DL
BILIRUBIN DIRECT: 0.17 MG/DL
BILIRUBIN, INDIRECT: 0.55 MG/DL (ref 0–1)
BILIRUBIN, INDIRECT: 0.55 MG/DL (ref 0–1)
BUN BLDV-MCNC: 17 MG/DL (ref 8–23)
BUN/CREAT BLD: ABNORMAL (ref 9–20)
CALCIUM SERPL-MCNC: 9.8 MG/DL (ref 8.6–10.4)
CHLORIDE BLD-SCNC: 106 MMOL/L (ref 98–107)
CHOLESTEROL/HDL RATIO: 3.3
CHOLESTEROL: 204 MG/DL
CO2: 25 MMOL/L (ref 20–31)
CREAT SERPL-MCNC: 0.69 MG/DL (ref 0.5–0.9)
DIFFERENTIAL TYPE: NORMAL
DLCO %PRED: 0 %
DLCO PRED: NORMAL
DLCO/VA %PRED: NORMAL
DLCO/VA PRED: NORMAL
DLCO/VA: NORMAL
DLCO: NORMAL
EOSINOPHILS RELATIVE PERCENT: 2 % (ref 1–4)
EXPIRATORY TIME-POST: NORMAL
EXPIRATORY TIME: NORMAL
FEF 25-75% %CHNG: NORMAL
FEF 25-75% %PRED-POST: NORMAL
FEF 25-75% %PRED-PRE: NORMAL
FEF 25-75% PRED: NORMAL
FEF 25-75%-POST: NORMAL
FEF 25-75%-PRE: NORMAL
FEV1 %PRED-POST: 0 %
FEV1 %PRED-PRE: 0 %
FEV1 PRED: NORMAL
FEV1-POST: NORMAL
FEV1-PRE: NORMAL
FEV1/FVC %PRED-POST: NORMAL
FEV1/FVC %PRED-PRE: NORMAL
FEV1/FVC PRED: NORMAL
FEV1/FVC-POST: 0 %
FEV1/FVC-PRE: 0 %
FVC %PRED-POST: NORMAL
FVC %PRED-PRE: NORMAL
FVC PRED: NORMAL
FVC-POST: NORMAL
FVC-PRE: NORMAL
GAW %PRED: NORMAL
GAW PRED: NORMAL
GAW: NORMAL
GFR AFRICAN AMERICAN: >60 ML/MIN
GFR NON-AFRICAN AMERICAN: >60 ML/MIN
GFR SERPL CREATININE-BSD FRML MDRD: ABNORMAL ML/MIN/{1.73_M2}
GFR SERPL CREATININE-BSD FRML MDRD: ABNORMAL ML/MIN/{1.73_M2}
GLOBULIN: NORMAL G/DL (ref 1.5–3.8)
GLOBULIN: NORMAL G/DL (ref 1.5–3.8)
GLUCOSE BLD-MCNC: 103 MG/DL (ref 70–99)
HCT VFR BLD CALC: 40.1 % (ref 36.3–47.1)
HDLC SERPL-MCNC: 62 MG/DL
HEMOGLOBIN: 13.6 G/DL (ref 11.9–15.1)
IC %PRED: NORMAL
IC PRED: NORMAL
IC: NORMAL
IMMATURE GRANULOCYTES: 0 %
LDL CHOLESTEROL: 113 MG/DL (ref 0–130)
LYMPHOCYTES # BLD: 38 % (ref 24–43)
MCH RBC QN AUTO: 29.8 PG (ref 25.2–33.5)
MCHC RBC AUTO-ENTMCNC: 33.9 G/DL (ref 28.4–34.8)
MCV RBC AUTO: 87.9 FL (ref 82.6–102.9)
MEP: NORMAL
MIP: NORMAL
MONOCYTES # BLD: 9 % (ref 3–12)
MVV %PRED-PRE: NORMAL
MVV PRED: NORMAL
MVV-PRE: NORMAL
NRBC AUTOMATED: 0 PER 100 WBC
PDW BLD-RTO: 14.1 % (ref 11.8–14.4)
PEF %PRED-POST: NORMAL
PEF %PRED-PRE: NORMAL
PEF PRED: NORMAL
PEF%CHNG: NORMAL
PEF-POST: NORMAL
PEF-PRE: NORMAL
PLATELET # BLD: 223 K/UL (ref 138–453)
PLATELET ESTIMATE: NORMAL
PMV BLD AUTO: 10.5 FL (ref 8.1–13.5)
POTASSIUM SERPL-SCNC: 4.1 MMOL/L (ref 3.7–5.3)
RAW %PRED: NORMAL
RAW PRED: NORMAL
RAW: NORMAL
RBC # BLD: 4.56 M/UL (ref 3.95–5.11)
RBC # BLD: NORMAL 10*6/UL
RV %PRED: NORMAL
RV PRED: NORMAL
RV: NORMAL
SEG NEUTROPHILS: 50 % (ref 36–65)
SEGMENTED NEUTROPHILS ABSOLUTE COUNT: 2.15 K/UL (ref 1.5–8.1)
SODIUM BLD-SCNC: 139 MMOL/L (ref 135–144)
SVC %PRED: NORMAL
SVC PRED: NORMAL
SVC: NORMAL
TLC %PRED: 0 %
TLC PRED: NORMAL
TLC: NORMAL
TOTAL PROTEIN: 6.9 G/DL (ref 6.4–8.3)
TOTAL PROTEIN: 6.9 G/DL (ref 6.4–8.3)
TRIGL SERPL-MCNC: 147 MG/DL
VA %PRED: NORMAL
VA PRED: NORMAL
VA: NORMAL
VLDLC SERPL CALC-MCNC: ABNORMAL MG/DL (ref 1–30)
VTG %PRED: NORMAL
VTG PRED: NORMAL
VTG: NORMAL
WBC # BLD: 4.3 K/UL (ref 3.5–11.3)
WBC # BLD: NORMAL 10*3/UL

## 2020-01-21 PROCEDURE — 81479 UNLISTED MOLECULAR PATHOLOGY: CPT

## 2020-01-21 PROCEDURE — 88350 IMFLUOR EA ADDL 1ANTB STN PX: CPT

## 2020-01-21 PROCEDURE — 80061 LIPID PANEL: CPT

## 2020-01-21 PROCEDURE — 80048 BASIC METABOLIC PNL TOTAL CA: CPT

## 2020-01-21 PROCEDURE — 82397 CHEMILUMINESCENT ASSAY: CPT

## 2020-01-21 PROCEDURE — 82784 ASSAY IGA/IGD/IGG/IGM EACH: CPT

## 2020-01-21 PROCEDURE — 88346 IMFLUOR 1ST 1ANTB STAIN PX: CPT

## 2020-01-21 PROCEDURE — 36415 COLL VENOUS BLD VENIPUNCTURE: CPT

## 2020-01-21 PROCEDURE — 80076 HEPATIC FUNCTION PANEL: CPT

## 2020-01-21 PROCEDURE — 85025 COMPLETE CBC W/AUTO DIFF WBC: CPT

## 2020-01-21 PROCEDURE — 86140 C-REACTIVE PROTEIN: CPT

## 2020-01-21 PROCEDURE — 83516 IMMUNOASSAY NONANTIBODY: CPT

## 2020-01-21 PROCEDURE — 83520 IMMUNOASSAY QUANT NOS NONAB: CPT

## 2020-01-21 ASSESSMENT — PULMONARY FUNCTION TESTS
FEV1_PERCENT_PREDICTED_POST: 0
FEV1/FVC_PRE: 0
FEV1_PERCENT_PREDICTED_PRE: 0
FEV1/FVC_POST: 0

## 2020-01-22 LAB
GLIADIN DEAMINIDATED PEPTIDE AB IGA: 1.6 U/ML
GLIADIN DEAMINIDATED PEPTIDE AB IGG: 2.2 U/ML
IGA: 136 MG/DL (ref 70–400)
TISSUE TRANSGLUTAMINASE IGA: 0.6 U/ML

## 2020-01-28 LAB — IBD PANEL: NORMAL

## 2020-03-03 ENCOUNTER — TELEPHONE (OUTPATIENT)
Dept: FAMILY MEDICINE CLINIC | Age: 75
End: 2020-03-03

## 2020-03-03 NOTE — TELEPHONE ENCOUNTER
Randall from [employer] called, an order was placed for a sleep study to be done in a hospital setting. Using the diagnosis provided it will not be covered. A home sleep study can be done using those same codes. Is it ok to order?

## 2020-03-09 ENCOUNTER — OFFICE VISIT (OUTPATIENT)
Dept: GASTROENTEROLOGY | Age: 75
End: 2020-03-09
Payer: MEDICARE

## 2020-03-09 ENCOUNTER — TELEPHONE (OUTPATIENT)
Dept: FAMILY MEDICINE CLINIC | Age: 75
End: 2020-03-09

## 2020-03-09 VITALS
DIASTOLIC BLOOD PRESSURE: 89 MMHG | SYSTOLIC BLOOD PRESSURE: 139 MMHG | HEART RATE: 77 BPM | WEIGHT: 182 LBS | BODY MASS INDEX: 30.88 KG/M2

## 2020-03-09 PROCEDURE — 99214 OFFICE O/P EST MOD 30 MIN: CPT | Performed by: INTERNAL MEDICINE

## 2020-03-09 ASSESSMENT — ENCOUNTER SYMPTOMS
NAUSEA: 0
ABDOMINAL PAIN: 1
COUGH: 0
VOMITING: 0
ANAL BLEEDING: 1
RESPIRATORY NEGATIVE: 1
ALLERGIC/IMMUNOLOGIC NEGATIVE: 1
DIARRHEA: 1
CONSTIPATION: 0
TROUBLE SWALLOWING: 0
SINUS PRESSURE: 0
ABDOMINAL DISTENTION: 1
VOICE CHANGE: 0
RECTAL PAIN: 0
EYES NEGATIVE: 1
BACK PAIN: 0
WHEEZING: 0
SORE THROAT: 0
BLOOD IN STOOL: 0
CHOKING: 0

## 2020-03-09 NOTE — TELEPHONE ENCOUNTER
Patient called and said that she had spoken with you about taking home the sleep study kit.   I did explain to her that they are all out at the moment

## 2020-03-09 NOTE — TELEPHONE ENCOUNTER
Please call patient regarding note from 3/3/20 for sleep study, she is home from [place]. Was on hold and call got lost. Tried to call back and reveived VM.

## 2020-04-10 ENCOUNTER — VIRTUAL VISIT (OUTPATIENT)
Dept: FAMILY MEDICINE CLINIC | Age: 75
End: 2020-04-10
Payer: MEDICARE

## 2020-04-10 ENCOUNTER — TELEPHONE (OUTPATIENT)
Dept: FAMILY MEDICINE CLINIC | Age: 75
End: 2020-04-10

## 2020-04-10 PROBLEM — K92.1 BLOOD IN STOOL: Status: RESOLVED | Noted: 2019-12-04 | Resolved: 2020-04-10

## 2020-04-10 PROCEDURE — 99213 OFFICE O/P EST LOW 20 MIN: CPT | Performed by: NURSE PRACTITIONER

## 2020-04-10 ASSESSMENT — ENCOUNTER SYMPTOMS
COUGH: 1
SHORTNESS OF BREATH: 0

## 2020-04-10 NOTE — PROGRESS NOTES
Annual Wellness Visit (AWV)  01/08/2021    Lipid screen  01/21/2021    DTaP/Tdap/Td vaccine (3 - Td) 11/18/2029    Flu vaccine  Completed    Shingles Vaccine  Completed    Pneumococcal 65+ years Vaccine  Completed    Hepatitis A vaccine  Aged Out    Hepatitis B vaccine  Aged Out    Hib vaccine  Aged Out    Meningococcal (ACWY) vaccine  Aged Out       PHYSICAL EXAMINATION:  [ INSTRUCTIONS:  \"[x]\" Indicates a positive item  \"[]\" Indicates a negative item  -- DELETE ALL ITEMS NOT EXAMINED]  Vital Signs: Not completed due to virtual visit. Constitutional: [x] Appears well-developed and well-nourished [x] No apparent distress      [] Abnormal-   Mental status  [x] Alert and awake  [x] Oriented to person/place/time [x]Able to follow commands      Eyes:  EOM    [x]  Normal  [] Abnormal-  Sclera  [x]  Normal  [] Abnormal -         Discharge [x]  None visible  [] Abnormal -    HENT:   [x] Normocephalic, atraumatic. [] Abnormal   [x] Mouth/Throat: Mucous membranes are moist.     External Ears [x] Normal  [] Abnormal-     Neck: [x] No visualized mass     Pulmonary/Chest: [x] Respiratory effort normal.  [x] No visualized signs of difficulty breathing or respiratory distress        [] Abnormal-      Musculoskeletal:        [x] Normal range of motion of neck        [] Abnormal-       Neurological:        [x] No Facial Asymmetry (Cranial nerve 7 motor function) (limited exam to video visit)          [x] No gaze palsy        [] Abnormal-         Skin:        [x] No significant exanthematous lesions or discoloration noted on facial skin         [] Abnormal-            Psychiatric:       [x] Normal Affect [x] No Hallucinations        [] Abnormal-     ASSESSMENT/PLAN:    1. Hyperlipidemia, unspecified hyperlipidemia type  -Stable: Con't all medications as ordered, con't current tx plan  -Re-check labs once COVID-19 pandemic has resolved  - Lipid Panel; Future  - Hepatic Function Panel; Future    2.  Asthma due to seasonal advised to contact this office for worsening conditions or problems, and seek emergency medical treatment and/or call 911 if deemed necessary. Services were provided through a video synchronous discussion virtually to substitute for in-person clinic visit. Patient and provider were located at their individual homes. 11-20 minutes were spent on the digital evaluation and management of this patient. --ARIEL Rajput CNP on 4/10/2020 at 10:43 AM    An electronic signature was used to authenticate this note.

## 2020-05-06 ENCOUNTER — TELEPHONE (OUTPATIENT)
Dept: GASTROENTEROLOGY | Age: 75
End: 2020-05-06

## 2020-05-07 ENCOUNTER — VIRTUAL VISIT (OUTPATIENT)
Dept: GASTROENTEROLOGY | Age: 75
End: 2020-05-07
Payer: MEDICARE

## 2020-05-07 PROCEDURE — 99211 OFF/OP EST MAY X REQ PHY/QHP: CPT | Performed by: INTERNAL MEDICINE

## 2020-05-07 ASSESSMENT — ENCOUNTER SYMPTOMS
DIARRHEA: 0
RECTAL PAIN: 0
BACK PAIN: 0
ABDOMINAL PAIN: 0
RESPIRATORY NEGATIVE: 1
COUGH: 0
WHEEZING: 0
ALLERGIC/IMMUNOLOGIC NEGATIVE: 1
CONSTIPATION: 0
EYES NEGATIVE: 1
ANAL BLEEDING: 0
NAUSEA: 0
ABDOMINAL DISTENTION: 1
BLOOD IN STOOL: 0
VOMITING: 0
CHOKING: 0
VOICE CHANGE: 0
SINUS PRESSURE: 0
SORE THROAT: 0
TROUBLE SWALLOWING: 0

## 2020-05-07 NOTE — PROGRESS NOTES
VIRTUAL VISIT:  VIDEO    Chelle Rivera is a 75 y.o. female evaluated via video on 2020. Physical ID was verified on screen, along with verbal confirmation of  and full name. Consent:  She and/or health care decision maker is aware that that she may receive a bill for this video service, depending on her insurance coverage, and has provided verbal consent to proceed: Yes      Documentation:  I communicated with the patient and/or health care decision maker about her GI issues. Details of this discussion including any medical advice provided:     Shira Ulloa a 75 y. o. female with a past history remarkable for arthritis, HTN (diet controlled) , referred for evaluation of fecal incontinence and blood in the stool. She has been on the "[employer] diet and has been extremely compliant with her regimen, since onset of diet has noted significant bloating, abdominal distention and increased fecal urgency. Advised cessation of this particular diet.      Patient was observed significant clinical improvement with relationship to her bloating abdominal distention and fecal urgency while avoiding the gas producing foods from the list provided on the last visit. Celiac disease panel was negative  Comprehensive food allergy panel was negative  Basic lab work was unremarkable      Clinically the patient is doing very well from a GI standpoint and with her dietary modifications and restrictions. She is yet to see a nutritionist/dietitian to aid in her weight loss. She has gained a few pound since her last visit in March. She was able to get in contact with genetic counseling and was given a packet for filling to complete her initial evaluation prior to testing for Hancock syndrome.     Recommendation:  Patient is to continue with simethicone as needed  We will provide referral to diet nutrition to aid in weight loss goals  Will await results of genetic counseling prior to next visit  Return

## 2020-05-08 ENCOUNTER — TELEPHONE (OUTPATIENT)
Dept: GASTROENTEROLOGY | Age: 75
End: 2020-05-08

## 2020-06-04 ENCOUNTER — HOSPITAL ENCOUNTER (OUTPATIENT)
Age: 75
Discharge: HOME | End: 2020-06-04
Payer: MEDICARE

## 2020-06-04 LAB
ABSOLUTE EOS #: 0.07 K/UL (ref 0–0.44)
ABSOLUTE IMMATURE GRANULOCYTE: <0.03 K/UL (ref 0–0.3)
ABSOLUTE LYMPH #: 1.71 K/UL (ref 1.1–3.7)
ABSOLUTE MONO #: 0.46 K/UL (ref 0.1–1.2)
ALBUMIN SERPL-MCNC: 4.1 G/DL (ref 3.5–5.2)
ALBUMIN/GLOBULIN RATIO: 1.5 (ref 1–2.5)
ALP BLD-CCNC: 77 U/L (ref 35–104)
ALT SERPL-CCNC: 13 U/L (ref 5–33)
ANION GAP SERPL CALCULATED.3IONS-SCNC: 13 MMOL/L (ref 9–17)
AST SERPL-CCNC: 18 U/L
BASOPHILS # BLD: 1 % (ref 0–2)
BASOPHILS ABSOLUTE: 0.04 K/UL (ref 0–0.2)
BILIRUB SERPL-MCNC: 1.03 MG/DL (ref 0.3–1.2)
BUN BLDV-MCNC: 18 MG/DL (ref 8–23)
BUN/CREAT BLD: ABNORMAL (ref 9–20)
CALCIUM SERPL-MCNC: 9.6 MG/DL (ref 8.6–10.4)
CHLORIDE BLD-SCNC: 107 MMOL/L (ref 98–107)
CO2: 22 MMOL/L (ref 20–31)
CORTISOL COLLECTION INFO: NORMAL
CORTISOL: 8.9 UG/DL (ref 2.7–18.4)
CREAT SERPL-MCNC: 0.77 MG/DL (ref 0.5–0.9)
DIFFERENTIAL TYPE: NORMAL
EOSINOPHILS RELATIVE PERCENT: 2 % (ref 1–4)
GFR AFRICAN AMERICAN: >60 ML/MIN
GFR NON-AFRICAN AMERICAN: >60 ML/MIN
GFR SERPL CREATININE-BSD FRML MDRD: ABNORMAL ML/MIN/{1.73_M2}
GFR SERPL CREATININE-BSD FRML MDRD: ABNORMAL ML/MIN/{1.73_M2}
GLUCOSE BLD-MCNC: 101 MG/DL (ref 70–99)
HCT VFR BLD CALC: 42 % (ref 36.3–47.1)
HEMOGLOBIN: 13.8 G/DL (ref 11.9–15.1)
IMMATURE GRANULOCYTES: 0 %
LYMPHOCYTES # BLD: 43 % (ref 24–43)
MCH RBC QN AUTO: 28.7 PG (ref 25.2–33.5)
MCHC RBC AUTO-ENTMCNC: 32.9 G/DL (ref 28.4–34.8)
MCV RBC AUTO: 87.3 FL (ref 82.6–102.9)
MONOCYTES # BLD: 12 % (ref 3–12)
NRBC AUTOMATED: 0 PER 100 WBC
PDW BLD-RTO: 14.1 % (ref 11.8–14.4)
PLATELET # BLD: 216 K/UL (ref 138–453)
PLATELET ESTIMATE: NORMAL
PMV BLD AUTO: 10 FL (ref 8.1–13.5)
POTASSIUM SERPL-SCNC: 4.2 MMOL/L (ref 3.7–5.3)
PTH INTACT: 33.7 PG/ML (ref 15–65)
RBC # BLD: 4.81 M/UL (ref 3.95–5.11)
RBC # BLD: NORMAL 10*6/UL
SEG NEUTROPHILS: 42 % (ref 36–65)
SEGMENTED NEUTROPHILS ABSOLUTE COUNT: 1.64 K/UL (ref 1.5–8.1)
SODIUM BLD-SCNC: 142 MMOL/L (ref 135–144)
T3 FREE: 3.02 PG/ML (ref 2.02–4.43)
THYROXINE, FREE: 1.04 NG/DL (ref 0.93–1.7)
TOTAL PROTEIN: 6.8 G/DL (ref 6.4–8.3)
TSH SERPL DL<=0.05 MIU/L-ACNC: 2.03 MIU/L (ref 0.3–5)
VITAMIN D 25-HYDROXY: 30.4 NG/ML (ref 30–100)
WBC # BLD: 3.9 K/UL (ref 3.5–11.3)
WBC # BLD: NORMAL 10*3/UL

## 2020-06-04 PROCEDURE — 80053 COMPREHEN METABOLIC PANEL: CPT

## 2020-06-04 PROCEDURE — 82024 ASSAY OF ACTH: CPT

## 2020-06-04 PROCEDURE — 83970 ASSAY OF PARATHORMONE: CPT

## 2020-06-04 PROCEDURE — 84439 ASSAY OF FREE THYROXINE: CPT

## 2020-06-04 PROCEDURE — 82306 VITAMIN D 25 HYDROXY: CPT

## 2020-06-04 PROCEDURE — 84481 FREE ASSAY (FT-3): CPT

## 2020-06-04 PROCEDURE — 85025 COMPLETE CBC W/AUTO DIFF WBC: CPT

## 2020-06-04 PROCEDURE — 36415 COLL VENOUS BLD VENIPUNCTURE: CPT

## 2020-06-04 PROCEDURE — 84443 ASSAY THYROID STIM HORMONE: CPT

## 2020-06-04 PROCEDURE — 82533 TOTAL CORTISOL: CPT

## 2020-06-05 LAB — ADRENOCORTICOTROPIC HORMONE: 22 PG/ML (ref 6–58)

## 2020-07-08 ENCOUNTER — HOSPITAL ENCOUNTER (OUTPATIENT)
Age: 75
Discharge: HOME | End: 2020-07-08
Payer: MEDICARE

## 2020-07-08 LAB
ALBUMIN SERPL-MCNC: 4.1 G/DL (ref 3.5–5.2)
ALBUMIN/GLOBULIN RATIO: 1.6 (ref 1–2.5)
ALP BLD-CCNC: 78 U/L (ref 35–104)
ALT SERPL-CCNC: 21 U/L (ref 5–33)
AST SERPL-CCNC: 23 U/L
BILIRUB SERPL-MCNC: 0.81 MG/DL (ref 0.3–1.2)
BILIRUBIN DIRECT: 0.19 MG/DL
BILIRUBIN, INDIRECT: 0.62 MG/DL (ref 0–1)
CHOLESTEROL/HDL RATIO: 2.2
CHOLESTEROL: 154 MG/DL
GLOBULIN: NORMAL G/DL (ref 1.5–3.8)
HDLC SERPL-MCNC: 70 MG/DL
LDL CHOLESTEROL: 67 MG/DL (ref 0–130)
TOTAL PROTEIN: 6.6 G/DL (ref 6.4–8.3)
TRIGL SERPL-MCNC: 86 MG/DL
VLDLC SERPL CALC-MCNC: NORMAL MG/DL (ref 1–30)

## 2020-07-08 PROCEDURE — 36415 COLL VENOUS BLD VENIPUNCTURE: CPT

## 2020-07-08 PROCEDURE — 80061 LIPID PANEL: CPT

## 2020-07-08 PROCEDURE — 80076 HEPATIC FUNCTION PANEL: CPT

## 2020-07-10 ENCOUNTER — OFFICE VISIT (OUTPATIENT)
Dept: FAMILY MEDICINE CLINIC | Age: 75
End: 2020-07-10
Payer: MEDICARE

## 2020-07-10 ENCOUNTER — HOSPITAL ENCOUNTER (OUTPATIENT)
Age: 75
Discharge: HOME | End: 2020-07-10
Payer: MEDICARE

## 2020-07-10 VITALS
HEART RATE: 74 BPM | DIASTOLIC BLOOD PRESSURE: 82 MMHG | OXYGEN SATURATION: 95 % | BODY MASS INDEX: 31.22 KG/M2 | RESPIRATION RATE: 16 BRPM | WEIGHT: 184 LBS | SYSTOLIC BLOOD PRESSURE: 138 MMHG | TEMPERATURE: 97.2 F

## 2020-07-10 PROBLEM — M79.641 BILATERAL HAND PAIN: Status: ACTIVE | Noted: 2020-07-10

## 2020-07-10 PROBLEM — M79.642 BILATERAL HAND PAIN: Status: ACTIVE | Noted: 2020-07-10

## 2020-07-10 PROBLEM — R03.0 ELEVATED BLOOD PRESSURE READING: Status: ACTIVE | Noted: 2020-07-10

## 2020-07-10 LAB
C-REACTIVE PROTEIN: 0.4 MG/L (ref 0–5)
RHEUMATOID FACTOR: 10.6 IU/ML
SARS-COV-2 ANTIBODY, TOTAL: NEGATIVE
SEDIMENTATION RATE, ERYTHROCYTE: 1 MM (ref 0–30)
URIC ACID: 5.7 MG/DL (ref 2.4–5.7)

## 2020-07-10 PROCEDURE — 86140 C-REACTIVE PROTEIN: CPT

## 2020-07-10 PROCEDURE — 99214 OFFICE O/P EST MOD 30 MIN: CPT | Performed by: NURSE PRACTITIONER

## 2020-07-10 PROCEDURE — 36415 COLL VENOUS BLD VENIPUNCTURE: CPT

## 2020-07-10 PROCEDURE — 85651 RBC SED RATE NONAUTOMATED: CPT

## 2020-07-10 PROCEDURE — 86038 ANTINUCLEAR ANTIBODIES: CPT

## 2020-07-10 PROCEDURE — 86225 DNA ANTIBODY NATIVE: CPT

## 2020-07-10 PROCEDURE — 85652 RBC SED RATE AUTOMATED: CPT

## 2020-07-10 PROCEDURE — 86200 CCP ANTIBODY: CPT

## 2020-07-10 PROCEDURE — 86769 SARS-COV-2 COVID-19 ANTIBODY: CPT

## 2020-07-10 PROCEDURE — 86235 NUCLEAR ANTIGEN ANTIBODY: CPT

## 2020-07-10 PROCEDURE — 84550 ASSAY OF BLOOD/URIC ACID: CPT

## 2020-07-10 PROCEDURE — 86431 RHEUMATOID FACTOR QUANT: CPT

## 2020-07-10 RX ORDER — VITAMIN B COMPLEX
1 TABLET ORAL DAILY
COMMUNITY

## 2020-07-10 ASSESSMENT — ENCOUNTER SYMPTOMS
BLOOD IN STOOL: 0
SHORTNESS OF BREATH: 0

## 2020-07-10 NOTE — PROGRESS NOTES
Visit Information    Have you changed or started any medications since your last visit including any over-the-counter medicines, vitamins, or herbal medicines? no   Have you stopped taking any of your medications? Is so, why? -  no  Are you having any side effects from any of your medications? - no    Have you seen any other physician or provider since your last visit?  no   Have you had any other diagnostic tests since your last visit?  no   Have you been seen in the emergency room and/or had an admission in a hospital since we last saw you?  no   Have you had your routine dental cleaning in the past 6 months?  yes - 06/2020     Do you have an active MyChart account? If no, what is the barrier?   No:     Patient Care Team:  ARIEL Hammond CNP as PCP - General (Nurse Practitioner Family)  ARIEL Hammond CNP as PCP - [facility] Provider  Soraya Burch MD as Consulting Physician (Gastroenterology)    Medical History Review  Past Medical, Family, and Social History reviewed and does contribute to the patient presenting condition    Health Maintenance   Topic Date Due    Colon cancer screen colonoscopy  11/05/1994    DEXA (modify frequency per FRAX score)  11/05/1999    Flu vaccine (1) 09/01/2020    Annual Wellness Visit (AWV)  01/08/2021    Lipid screen  07/08/2021    DTaP/Tdap/Td vaccine (3 - Td) 11/18/2029    Shingles Vaccine  Completed    Pneumococcal 65+ years Vaccine  Completed    Hepatitis A vaccine  Aged Out    Hepatitis B vaccine  Aged Out    Hib vaccine  Aged Out    Meningococcal (ACWY) vaccine  Aged Out

## 2020-07-10 NOTE — PROGRESS NOTES
Janis Hays. Jaleel Flores, APRN-CNP  [address] [place] , [address]  [facility] [address]  Dept: [phone]  Dept Fax: [phone]    HPI:   /82   Pulse 74   Temp 97.2 °F (36.2 °C)   Resp 16   Wt 184 lb (83.5 kg)   SpO2 95%   BMI 31.22 kg/m²      Stella Zuniga is a 75 y.o. female who presents today for her medical conditions/complaintsas noted below. Stella Zuniga is c/o of Hyperlipidemia and Discuss Labs    HPI  1. Hyperlipidemia, unspecified hyperlipidemia type  -Started on Lipitor 20 mg QD about 7 months ago  -Tolerating medication well, no new muscle aches  Lab Results   Component Value Date    CHOL 154 07/08/2020    CHOL 204 (H) 01/21/2020    CHOL 213 (H) 01/08/2020     Lab Results   Component Value Date    TRIG 86 07/08/2020    TRIG 147 01/21/2020    TRIG 102 01/08/2020     Lab Results   Component Value Date    HDL 70 07/08/2020    HDL 62 01/21/2020    HDL 66 01/08/2020     Lab Results   Component Value Date    LDLCHOLESTEROL 67 07/08/2020    LDLCHOLESTEROL 113 01/21/2020    LDLCHOLESTEROL 127 01/08/2020     Lab Results   Component Value Date    VLDL NOT REPORTED 07/08/2020    VLDL NOT REPORTED 01/21/2020    VLDL NOT REPORTED 01/08/2020     Lab Results   Component Value Date    CHOLHDLRATIO 2.2 07/08/2020    CHOLHDLRATIO 3.3 01/21/2020    CHOLHDLRATIO 3.2 01/08/2020     2. 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  Lightheadedness  -Still happens occasionally  -Notes when she is hot, this triggers problem  -Denies syncope  -She does feel it coming on    -Chronic left hip pain- plans may be for hip replacement in the future- Dr. Rosa Ge did her right hip replacement    -Planning on total hysterectomy coming up- following w/OBGYN- Dr. Inez Crump  Past Medical History:   Diagnosis Date    Arthritis     Blood in stool 12/4/2019    CMV [facility]omegalovirus infection) ([facility] [address]     Hypertension       Past Surgical History:   Procedure Laterality Date    CERVICAL FUSION      DILATION AND CURETTAGE OF UTERUS  09/05/2019    TOTAL HIP ARTHROPLASTY Right 2013       Family History   Problem Relation Age of Onset    Stroke Mother     Heart Attack Father     Endometrial Cancer Sister         x2 sisters dx'd with endometrial CA    Colon Cancer Sister     Colon Cancer Sister     Other Paternal Grandmother         RA       Social History     Tobacco Use    Smoking status: Never Smoker    Smokeless tobacco: Never Used   Substance Use Topics    Alcohol use: Yes     Comment: 2 x monthly      Current Outpatient Medications   Medication Sig Dispense Refill    Coenzyme Q10 (COQ10) 100 MG CAPS Take 1 capsule by mouth daily       simethicone (MYLICON) 80 MG chewable tablet Take 1 tablet by mouth 4 times daily as needed for Flatulence 180 tablet 3    hydrocortisone (ANUSOL-HC) 2.5 % rectal cream Place rectally 2 times daily. 1 Tube 1    atorvastatin (LIPITOR) 20 MG tablet Take 1 tablet by mouth daily 90 tablet 1    mometasone-formoterol (DULERA) 100-5 MCG/ACT inhaler Inhale 2 puffs into the lungs 2 times daily Rinse mouth after using. 1 Inhaler 1    PROAIR  (90 Base) MCG/ACT inhaler Inhale 2 puffs into the lungs every 4-6 hours as needed for Wheezing or Shortness of Breath 1 Inhaler 0    loratadine (CLARITIN) 10 MG tablet Take 10 mg by mouth daily as needed (Seasonal allergies)       Biotin 1000 MCG TABS Take 1,000 mcg by mouth daily        No current facility-administered medications for this visit.       Allergies   Allergen Reactions    [facility] exudate. Eyes:      Conjunctiva/sclera: Conjunctivae normal.      Pupils: Pupils are equal, round, and reactive to light. Funduscopic exam:     Right eye: Red reflex present. Left eye: Red reflex present. Neck:      Musculoskeletal: Normal range of motion. Vascular: No carotid bruit or JVD. Cardiovascular:      Rate and Rhythm: Normal rate and regular rhythm. No extrasystoles are present. Chest Wall: PMI is not displaced. No thrill. Pulses:           Radial pulses are 2+ on the right side and 2+ on the left side. Dorsalis pedis pulses are 2+ on the right side and 2+ on the left side. Posterior tibial pulses are 2+ on the right side and 2+ on the left side. Heart sounds: Normal heart sounds. No murmur. No friction rub. No gallop. Pulmonary:      Effort: Pulmonary effort is normal. No accessory muscle usage or respiratory distress. Breath sounds: Normal breath sounds and air entry. No stridor. No decreased breath sounds, wheezing, rhonchi or rales. Musculoskeletal: Normal range of motion. Right hand: She exhibits tenderness and bony tenderness. She exhibits normal range of motion, normal two-point discrimination, normal capillary refill, no deformity, no laceration and no swelling. Decreased sensation (Abnormal sensation ) noted. Normal strength noted. Right lower leg: No edema. Left lower leg: No edema. Lymphadenopathy:      Cervical: No cervical adenopathy. Skin:     General: Skin is warm and dry. Findings: No erythema or rash. Neurological:      General: No focal deficit present. Mental Status: She is alert and oriented to person, place, and time. GCS: GCS eye subscore is 4. GCS verbal subscore is 5. GCS motor subscore is 6. Gait: Gait is intact.  Gait normal.   Psychiatric:         Attention and Perception: Attention normal.         Mood and Affect: Mood normal.         Speech: Speech normal.         Behavior: Behavior normal.       Data:     Lab Results   Component Value Date     06/04/2020    K 4.2 06/04/2020     06/04/2020    CO2 22 06/04/2020    BUN 18 06/04/2020    CREATININE 0.77 06/04/2020    GLUCOSE 101 06/04/2020    PROT 6.6 07/08/2020    LABALBU 4.1 07/08/2020    BILITOT 0.81 07/08/2020    ALKPHOS 78 07/08/2020    AST 23 07/08/2020    ALT 21 07/08/2020     Lab Results   Component Value Date    WBC 3.9 06/04/2020    RBC 4.81 06/04/2020    HGB 13.8 06/04/2020    HCT 42.0 06/04/2020    MCV 87.3 06/04/2020    MCH 28.7 06/04/2020    MCHC 32.9 06/04/2020    RDW 14.1 06/04/2020     06/04/2020    MPV 10.0 06/04/2020     Lab Results   Component Value Date    TSH 2.03 06/04/2020     Lab Results   Component Value Date    CHOL 154 07/08/2020    HDL 70 07/08/2020    LABA1C 5.5 10/01/2019     Assessment & Plan:       Most recent labs reviewed and discussed with patient. 1. Hyperlipidemia, unspecified hyperlipidemia type  -Stable, improved: Con't all medications as ordered, con't current tx plan    2. Numbness and tingling in both hands  -Unchanged- s/s worse to right hand  -Patient would like to hold off on completing EMG until she tries acupuncture- I did give her the name of the [place] in [place], [place] to try  -Con't wearing brace PRN  -Call if s/s worsen/change  -R/o autoimmune process  - RALF Screen with Reflex; Future   - Anti-DNA Antibody, Double-Stranded; Future  - C-Reactive Protein; Future  - Cyclic Citrul Peptide Antibody, IgG; Future  - Rheumatoid Factor; Future  - Sedimentation rate, automated; Future  - Uric Acid; Future    3. Bilateral hand pain  -Unchanged: See instructions above  - RALF Screen with Reflex; Future  - Anti-DNA Antibody, Double-Stranded; Future  - C-Reactive Protein; Future  - Cyclic Citrul Peptide Antibody, IgG; Future  - Rheumatoid Factor; Future  - Sedimentation rate, automated; Future  - Uric Acid; Future    4.  Lightheadedness  -Stable: Con't all medications as ordered, con't current tx plan  -Stay well hydrated  -Do not drive if dizzy  -Seek emergent tx for severe dizziness at any time  -Declined neuro imaging at this time    5. Immunity status testing  -Labs as follows:  - Covid-19, Antibody, Total; Future    6. Family history of malignant neoplasm of endometrium  -Considering total hysterectomy in the future    7. Elevated BP reading  -Slightly elevated- Monitor outside of the office- goal BP <140/90, call for elevations, will monitor  BP Readings from Last 3 Encounters:   07/10/20 138/82   03/09/20 139/89   01/20/20 (!) 164/92   -Decrease salt in diet, healthy diet and routine exercise encouraged    Following w/GI- will possibly have colonoscopy in the future. Return in about 3 months (around 10/10/2020) for follow up on issues discussed at this appointment. Discussed use, benefit, and side effects of prescribed medications. Barriers to medication compliance addressed. All patient questions answered, patient voiced understanding. Tim Jose.  ARIEL Brunson-CNP

## 2020-07-13 PROBLEM — R76.8 POSITIVE ANA (ANTINUCLEAR ANTIBODY): Status: ACTIVE | Noted: 2020-07-13

## 2020-07-13 LAB
ANTI DNA DOUBLE STRANDED: 21 IU/ML
ANTI JO-1 IGG: 20 U/ML
ANTI RNP AB: 21 U/ML
ANTI SSA: 16 U/ML
ANTI SSB: 13 U/ML
ANTI-CENTROMERE: 9 U/ML
ANTI-NUCLEAR ANTIBODY (ANA): POSITIVE
ANTI-SCLERODERMA: 211 U/ML
ANTI-SMITH: 10 U/ML
CCP IGG ANTIBODIES: <1.5 U/ML
HISTONE ANTIBODY: 36 U/ML

## 2020-08-19 ENCOUNTER — TELEPHONE (OUTPATIENT)
Dept: GASTROENTEROLOGY | Age: 75
End: 2020-08-19

## 2020-09-04 ENCOUNTER — TELEPHONE (OUTPATIENT)
Dept: FAMILY MEDICINE CLINIC | Age: 75
End: 2020-09-04

## 2020-09-04 ENCOUNTER — VIRTUAL VISIT (OUTPATIENT)
Dept: FAMILY MEDICINE CLINIC | Age: 75
End: 2020-09-04
Payer: MEDICARE

## 2020-09-04 PROBLEM — J42 CHRONIC BRONCHITIS (HCC): Status: ACTIVE | Noted: 2020-09-04

## 2020-09-04 PROCEDURE — 99213 OFFICE O/P EST LOW 20 MIN: CPT | Performed by: NURSE PRACTITIONER

## 2020-09-04 RX ORDER — BENZONATATE 100 MG/1
100 CAPSULE ORAL 3 TIMES DAILY PRN
Qty: 30 CAPSULE | Refills: 0 | Status: SHIPPED | OUTPATIENT
Start: 2020-09-04 | End: 2020-09-14

## 2020-09-04 RX ORDER — GUAIFENESIN 600 MG/1
600-1200 TABLET, EXTENDED RELEASE ORAL 2 TIMES DAILY PRN
Qty: 60 TABLET | Refills: 0 | COMMUNITY
Start: 2020-09-04 | End: 2020-12-18

## 2020-09-04 RX ORDER — FLUTICASONE PROPIONATE 50 MCG
2 SPRAY, SUSPENSION (ML) NASAL DAILY
Qty: 1 BOTTLE | Refills: 0 | COMMUNITY
Start: 2020-09-04

## 2020-09-04 RX ORDER — MONTELUKAST SODIUM 10 MG/1
10 TABLET ORAL NIGHTLY
Qty: 30 TABLET | Refills: 1 | Status: SHIPPED | OUTPATIENT
Start: 2020-09-04 | End: 2020-09-07 | Stop reason: CLARIF

## 2020-09-04 ASSESSMENT — ENCOUNTER SYMPTOMS
EYE ITCHING: 0
SINUS PAIN: 0
COUGH: 1
SINUS PRESSURE: 0
RHINORRHEA: 0
EYE DISCHARGE: 0
SORE THROAT: 0
SHORTNESS OF BREATH: 1
WHEEZING: 0

## 2020-09-04 NOTE — PROGRESS NOTES
light-headedness. Negative for syncope. Prior to Visit Medications    Medication Sig Taking? Authorizing Provider   montelukast (SINGULAIR) 10 MG tablet Take 1 tablet by mouth nightly Yes Renette Dancer, APRN - CNP   fluticasone (FLONASE) 50 MCG/ACT nasal spray 2 sprays by Nasal route daily Yes Renette Dancer, APRN - CNP   guaiFENesin (MUCINEX) 600 MG extended release tablet Take 1-2 tablets by mouth 2 times daily as needed for Congestion Yes Renette Dancer, APRN - CNP   benzonatate (TESSALON PERLES) 100 MG capsule Take 1 capsule by mouth 3 times daily as needed for Cough Yes Renette Dancer, APRN - CNP   Coenzyme Q10 (COQ10) 100 MG CAPS Take 1 capsule by mouth daily  Yes Historical Provider, MD   simethicone (MYLICON) 80 MG chewable tablet Take 1 tablet by mouth 4 times daily as needed for Flatulence Yes Shania Castro MD   hydrocortisone (ANUSOL-HC) 2.5 % rectal cream Place rectally 2 times daily.  Yes Shania Castro MD   atorvastatin (LIPITOR) 20 MG tablet Take 1 tablet by mouth daily Yes Renette Dancer, APRN - CNP   loratadine (CLARITIN) 10 MG tablet Take 10 mg by mouth daily as needed (Seasonal allergies)  Yes Historical Provider, MD   Biotin 1000 MCG TABS Take 1,000 mcg by mouth daily  Yes Historical Provider, MD   PROAIR  (90 Base) MCG/ACT inhaler Inhale 2 puffs into the lungs every 4-6 hours as needed for Wheezing or Shortness of Breath  Patient not taking: Reported on 9/4/2020  Renette Dancer, APRN - CNP       Social History     Tobacco Use    Smoking status: Never Smoker    Smokeless tobacco: Never Used   Substance Use Topics    Alcohol use: Yes     Comment: 2 x monthly    Drug use: No      Allergies   Allergen Reactions    Oxycodone    ,   Past Medical History:   Diagnosis Date    Arthritis     Blood in stool 12/4/2019    CMV [facility]galovirus infection) ([facility] [address]     Hypertension    ,   Past Surgical History:   Procedure Laterality Date    CERVICAL FUSION      DILATION AND CURETTAGE OF UTERUS  09/05/2019    TOTAL HIP ARTHROPLASTY Right 2013   ,   Social History     Tobacco Use    Smoking status: Never Smoker    Smokeless tobacco: Never Used   Substance Use Topics    Alcohol use: Yes     Comment: 2 x monthly    Drug use: No   ,   Family History   Problem Relation Age of Onset    Stroke Mother     Heart Attack Father     Endometrial Cancer Sister 76    Colon Cancer Sister     Colon Cancer Sister         Endometrial CA possibly spread to colon    Endometrial Cancer Sister 70    Other Paternal Grandmother         RA   ,   Immunization History   Administered Date(s) Administered    Hepatitis A Adult (Havrix, Vaqta) 11/18/2019    Influenza Vaccine, unspecified formulation 10/14/2013, 12/20/2016    Influenza, High Dose (Fluzone 65 yrs and older) 12/02/2014, 11/16/2015, 10/02/2017, 11/06/2019    Influenza, Triv, inactivated, subunit, adjuvanted, IM (Fluad 65 yrs and older) 10/05/2018    Pneumococcal Conjugate 13-valent ([id number]) 10/02/2017, 10/05/2018    Pneumococcal Polysaccharide ([id number]) 10/12/2018    Pneumococcal Vaccine 10/02/2017    Tdap (Boostrix, Adacel) 10/12/2018, 11/18/2019    Zoster Recombinant (Shingrix) 04/19/2019, 11/06/2019   ,   Health Maintenance   Topic Date Due    Colon cancer screen colonoscopy  11/05/1994    Flu vaccine (1) 09/01/2020    DEXA (modify frequency per FRAX score)  07/10/2021 (Originally 11/5/1999)    Annual Wellness Visit (AWV)  01/08/2021    Lipid screen  07/08/2021    DTaP/Tdap/Td vaccine (3 - Td) 11/18/2029    Shingles Vaccine  Completed    Pneumococcal 65+ years Vaccine  Completed    Hepatitis A vaccine  Aged Out    Hepatitis B vaccine  Aged Out    Hib vaccine  Aged Out    Meningococcal (ACWY) vaccine  Aged Out       PHYSICAL EXAMINATION:  [ INSTRUCTIONS:  \"[x]\" Indicates a positive item  \"[]\" Indicates a negative item  -- DELETE ALL ITEMS NOT EXAMINED]  Vital Signs: Not completed due to virtual visit.     Constitutional: [x] Appears well-developed and well-nourished [x] No apparent distress      [] Abnormal-   Mental status  [x] Alert and awake  [x] Oriented to person/place/time [x]Able to follow commands      Eyes:  EOM    [x]  Normal  [] Abnormal-  Sclera  [x]  Normal  [] Abnormal -         Discharge [x]  None visible  [] Abnormal -    HENT:   [x] Normocephalic, atraumatic. [] Abnormal   [x] Mouth/Throat: Mucous membranes are moist.     External Ears [x] Normal  [] Abnormal-     Neck: [x] No visualized mass     Pulmonary/Chest: [x] Respiratory effort normal.  [x] No visualized signs of difficulty breathing or respiratory distress        [] Abnormal-     Neurological:        [x] No Facial Asymmetry (Cranial nerve 7 motor function) (limited exam to video visit)          [x] No gaze palsy        [] Abnormal-         Skin:        [x] No significant exanthematous lesions or discoloration noted on facial skin         [] Abnormal-            Psychiatric:       [x] Normal Affect [x] No Hallucinations        [] Abnormal-     Other pertinent observable physical exam findings- Patient appears generally well, is speaking full sentences clearly without any observable SOB, periodic dry cough noted, no diaphoresis. ASSESSMENT/PLAN:  1. Chronic bronchitis, unspecified chronic bronchitis type ([address]  2.  Asthma due to seasonal allergies, persistent cough (addendum dx added 9-7-20)  -Patient states these symptoms are chronic for her every fall, she does not appear acutely ill- low suspicion for COVID-19, patient is declining testing at this time  -Normal PFT 1/2020  -Was told previously per RT that performed PFT that she may have chronic bronchitis, I do believe her s/s are consistent with this and are exacerbated by seasonal allergies- will add Singulair for bronchospasm and con't Claritin during the day (does alternate w/Zyrtec)  -Addendum 9-6-20: Patient had called on 9-4-20 asking to speak to the \"doctor\" who diagnosed her with bronchitis, I believe this was the RT who performed her PFT, and she has not yet been referred to pulmonology through further discussion with patient- a note was also addended from 4-10-20, as it was stated she followed up w/pulmonology- I did send a Ayeah Gamest message post appointment asking if she would like a pulmonology referral  -Low suspicion for acute infection/acute process, but will obtain CXR  -PRN tessalon perles and/or Mucinex depending on s/s  -D/c Dulera, con't albuterol inhaler PRN  -Add Flonase daily  -Consider pulmonology referral in the future if s/s do not improve  -Call if s/s worsen/change, I did advise patient to go to ER for severe SOB at any time  - montelukast (SINGULAIR) 10 MG tablet; Take 1 tablet by mouth nightly  Dispense: 30 tablet; Refill: 1  - fluticasone (FLONASE) 50 MCG/ACT nasal spray; 2 sprays by Nasal route daily  Dispense: 1 Bottle; Refill: 0  - guaiFENesin (MUCINEX) 600 MG extended release tablet; Take 1-2 tablets by mouth 2 times daily as needed for Congestion  Dispense: 60 tablet; Refill: 0  - benzonatate (TESSALON PERLES) 100 MG capsule; Take 1 capsule by mouth 3 times daily as needed for Cough  Dispense: 30 capsule; Refill: 0  -Check CXR ASAP  - XR CHEST (2 VW); Future  -Addendum 9-7-20: Singulair d/c'd before patient started- see telephone encounter 9-7-20    3. Lightheadedness  -Dizziness con't, at times r/t SOB/coughing  -Has had recent EKG, carotid dopplers, and Holter monitor completed  -Will place new referral to cardiology  -Will refer for further evaluation and possible treatment:  - [facility] - Jyothi Madden MD, Cardiology, [place]  -Stay well hydrated  -Do not drive if dizzy  -Seek emergent tx for severe dizziness at any time    -Advised to f/u with genetic counselor as scheduled    Return for next scheduled appointment or sooner if needed- will need pre-op clearance, f/u cough.     Mary Roland is a 75 y.o. female being evaluated by a Virtual Visit (video visit)

## 2020-09-04 NOTE — PROGRESS NOTES
Visit Information    Have you changed or started any medications since your last visit including any over-the-counter medicines, vitamins, or herbal medicines? no   Have you stopped taking any of your medications? Is so, why? -  no  Are you having any side effects from any of your medications? - no    Have you seen any other physician or provider since your last visit?  no   Have you had any other diagnostic tests since your last visit?  no   Have you been seen in the emergency room and/or had an admission in a hospital since we last saw you?  no   Have you had your routine dental cleaning in the past 6 months?  yes -      Do you have an active MyChart account? If no, what is the barrier?   Yes    Patient Care Team:  ARIEL Parkinson CNP as PCP - General (Nurse Practitioner Family)  ARIEL Parkinson CNP as PCP - [facility] Provider  Jaimie Ruiz MD as Consulting Physician (Gastroenterology)    Medical History Review  Past Medical, Family, and Social History reviewed and does contribute to the patient presenting condition    Health Maintenance   Topic Date Due    Colon cancer screen colonoscopy  11/05/1994    Flu vaccine (1) 09/01/2020    DEXA (modify frequency per FRAX score)  07/10/2021 (Originally 11/5/1999)    Annual Wellness Visit (AWV)  01/08/2021    Lipid screen  07/08/2021    DTaP/Tdap/Td vaccine (3 - Td) 11/18/2029    Shingles Vaccine  Completed    Pneumococcal 65+ years Vaccine  Completed    Hepatitis A vaccine  Aged Out    Hepatitis B vaccine  Aged Out    Hib vaccine  Aged Out    Meningococcal (ACWY) vaccine  Aged Out

## 2020-09-07 ENCOUNTER — TELEPHONE (OUTPATIENT)
Dept: FAMILY MEDICINE CLINIC | Age: 75
End: 2020-09-07

## 2020-09-07 PROBLEM — R05.3 COUGH, PERSISTENT: Status: ACTIVE | Noted: 2020-09-07

## 2020-09-07 PROBLEM — R06.00 DYSPNEA: Status: ACTIVE | Noted: 2020-09-07

## 2020-09-07 NOTE — TELEPHONE ENCOUNTER
Last EKG- 10-1-19, will obtain updated EKG- discussed w/patient, plans to complete tomorrow w/labs and CXR.

## 2020-09-07 NOTE — TELEPHONE ENCOUNTER
Called patient back to see if she would like to proceed w/CT of head, since she has already had a thorough cardiac work up. She states that she would prefer to see pulmonology and cardiology first and then decide. She thinks previous allergist's name was Dr. Delores Chi (unsure of spelling) in [facility], but has not been seen in a couple of years. Will try to request their records. Will get pulmonologist's opinion to see if she needs new allergist referral or not. She will Mychart message a list of allergies that were noted while she was under his care. She had previously used [employer] inhaler as discussed at [facility], but did not feel it consistently helped, so stopped it, states she would like to take as few medications as possible. Denies SOB/lightheadedness while sitting. Will con't current tx plan as discussed.

## 2020-09-07 NOTE — TELEPHONE ENCOUNTER
Spoke to patient, as I was concerned that I had not discussed potential side effects of possible depression/mood changes with starting Singulair (has not yet picked up from pharmacy). Discussed these side effects with patient. It was decided that we would hold off from starting Singulair, and instead would refer her to pulmonology, who can also discuss possible relation to her allergies. She states that she also saw an acupuncturist previously that thought that chronic cough could be r/t degenerative changes in her back. She is agreeable to seeing pulmonologist- referral placed to Dr. Landon Herrera- information provided to schedule appointment (also given cardiologist's information- Dr. Brielle Alves- to schedule an appointment). States s/s are unchanged from VV on Friday, did walk 2 blocks over the weekend and had an episode of SOB combined w/lightheadedness (no passing out), but cough is slightly improved, con't to deny fever/chills. She states s/s are mostly unchanged from over the past year. I did encourage her to complete CXR as ordered. Also advised to take Claritin instead of Zyrtec, incase Zyrtec could be increasing dizziness (she states she alternates between the two)- will con't all other medications as ordered. Advised to stay well hydrated, do not drive if dizzy, seek emergent tx for severe dizziness, SOB, and/or chest pain at any time. Has a follow up appointment w/S. YAEL Paul on 10-12-20, advised to keep this appointment and to call the office if s/s worsen/change at any time. VM left at pharmacy cancelling Singulair. Patient verbalized understanding to all instructions.

## 2020-09-07 NOTE — TELEPHONE ENCOUNTER
Low suspicion for acute process, but I did discuss adding lab work for patient to complete w/CXR tomorrow (CXR was previously ordered Friday). She is agreeable- labs ordered.

## 2020-09-08 ENCOUNTER — HOSPITAL ENCOUNTER (OUTPATIENT)
Age: 75
Discharge: HOME | End: 2020-09-10
Payer: MEDICARE

## 2020-09-08 ENCOUNTER — HOSPITAL ENCOUNTER (OUTPATIENT)
Age: 75
Discharge: HOME | End: 2020-09-08
Payer: MEDICARE

## 2020-09-08 ENCOUNTER — TELEPHONE (OUTPATIENT)
Dept: FAMILY MEDICINE CLINIC | Age: 75
End: 2020-09-08

## 2020-09-08 ENCOUNTER — HOSPITAL ENCOUNTER (OUTPATIENT)
Dept: GENERAL RADIOLOGY | Age: 75
Discharge: HOME | End: 2020-09-10
Payer: MEDICARE

## 2020-09-08 ENCOUNTER — HOSPITAL ENCOUNTER (OUTPATIENT)
Dept: CT IMAGING | Age: 75
Discharge: HOME | End: 2020-09-10
Payer: MEDICARE

## 2020-09-08 LAB
ABSOLUTE EOS #: 0.08 K/UL (ref 0–0.44)
ABSOLUTE IMMATURE GRANULOCYTE: <0.03 K/UL (ref 0–0.3)
ABSOLUTE LYMPH #: 2.16 K/UL (ref 1.1–3.7)
ABSOLUTE MONO #: 0.6 K/UL (ref 0.1–1.2)
ALBUMIN SERPL-MCNC: 4.3 G/DL (ref 3.5–5.2)
ALBUMIN/GLOBULIN RATIO: 1.5 (ref 1–2.5)
ALP BLD-CCNC: 89 U/L (ref 35–104)
ALT SERPL-CCNC: 19 U/L (ref 5–33)
ANION GAP SERPL CALCULATED.3IONS-SCNC: 16 MMOL/L (ref 9–17)
AST SERPL-CCNC: 19 U/L
BASOPHILS # BLD: 1 % (ref 0–2)
BASOPHILS ABSOLUTE: 0.04 K/UL (ref 0–0.2)
BILIRUB SERPL-MCNC: 0.68 MG/DL (ref 0.3–1.2)
BUN BLDV-MCNC: 18 MG/DL (ref 8–23)
BUN/CREAT BLD: ABNORMAL (ref 9–20)
CALCIUM SERPL-MCNC: 9.6 MG/DL (ref 8.6–10.4)
CHLORIDE BLD-SCNC: 109 MMOL/L (ref 98–107)
CO2: 20 MMOL/L (ref 20–31)
CREAT SERPL-MCNC: 0.75 MG/DL (ref 0.5–0.9)
CREAT SERPL-MCNC: 0.88 MG/DL (ref 0.5–0.9)
D-DIMER QUANTITATIVE: 0.64 MG/L FEU
DIFFERENTIAL TYPE: NORMAL
EKG ATRIAL RATE: 65 BPM
EKG P AXIS: 65 DEGREES
EKG P-R INTERVAL: 178 MS
EKG Q-T INTERVAL: 406 MS
EKG QRS DURATION: 84 MS
EKG QTC CALCULATION (BAZETT): 422 MS
EKG R AXIS: -6 DEGREES
EKG T AXIS: 29 DEGREES
EKG VENTRICULAR RATE: 65 BPM
EOSINOPHILS RELATIVE PERCENT: 2 % (ref 1–4)
GFR AFRICAN AMERICAN: >60 ML/MIN
GFR AFRICAN AMERICAN: >60 ML/MIN
GFR NON-AFRICAN AMERICAN: >60 ML/MIN
GFR NON-AFRICAN AMERICAN: >60 ML/MIN
GFR SERPL CREATININE-BSD FRML MDRD: ABNORMAL ML/MIN/{1.73_M2}
GFR SERPL CREATININE-BSD FRML MDRD: ABNORMAL ML/MIN/{1.73_M2}
GFR SERPL CREATININE-BSD FRML MDRD: NORMAL ML/MIN/{1.73_M2}
GFR SERPL CREATININE-BSD FRML MDRD: NORMAL ML/MIN/{1.73_M2}
GLUCOSE BLD-MCNC: 92 MG/DL (ref 70–99)
HCT VFR BLD CALC: 41.9 % (ref 36.3–47.1)
HEMOGLOBIN: 13.8 G/DL (ref 11.9–15.1)
IMMATURE GRANULOCYTES: 0 %
LYMPHOCYTES # BLD: 40 % (ref 24–43)
MAGNESIUM: 2.2 MG/DL (ref 1.6–2.6)
MCH RBC QN AUTO: 28.8 PG (ref 25.2–33.5)
MCHC RBC AUTO-ENTMCNC: 32.9 G/DL (ref 28.4–34.8)
MCV RBC AUTO: 87.3 FL (ref 82.6–102.9)
MONOCYTES # BLD: 11 % (ref 3–12)
NRBC AUTOMATED: 0 PER 100 WBC
PDW BLD-RTO: 13.5 % (ref 11.8–14.4)
PLATELET # BLD: 262 K/UL (ref 138–453)
PLATELET ESTIMATE: NORMAL
PMV BLD AUTO: 10.2 FL (ref 8.1–13.5)
POTASSIUM SERPL-SCNC: 4.4 MMOL/L (ref 3.7–5.3)
RBC # BLD: 4.8 M/UL (ref 3.95–5.11)
RBC # BLD: NORMAL 10*6/UL
SEG NEUTROPHILS: 46 % (ref 36–65)
SEGMENTED NEUTROPHILS ABSOLUTE COUNT: 2.46 K/UL (ref 1.5–8.1)
SODIUM BLD-SCNC: 145 MMOL/L (ref 135–144)
TOTAL PROTEIN: 7.1 G/DL (ref 6.4–8.3)
TROPONIN INTERP: NORMAL
TROPONIN T: NORMAL NG/ML
TROPONIN, HIGH SENSITIVITY: 7 NG/L (ref 0–14)
TSH SERPL DL<=0.05 MIU/L-ACNC: 1.8 MIU/L (ref 0.3–5)
WBC # BLD: 5.4 K/UL (ref 3.5–11.3)
WBC # BLD: NORMAL 10*3/UL

## 2020-09-08 PROCEDURE — 93005 ELECTROCARDIOGRAM TRACING: CPT | Performed by: NURSE PRACTITIONER

## 2020-09-08 PROCEDURE — 6360000004 HC RX CONTRAST MEDICATION: Performed by: NURSE PRACTITIONER

## 2020-09-08 PROCEDURE — 82565 ASSAY OF CREATININE: CPT

## 2020-09-08 PROCEDURE — 71260 CT THORAX DX C+: CPT

## 2020-09-08 PROCEDURE — 71046 X-RAY EXAM CHEST 2 VIEWS: CPT

## 2020-09-08 PROCEDURE — 84484 ASSAY OF TROPONIN QUANT: CPT

## 2020-09-08 PROCEDURE — 85379 FIBRIN DEGRADATION QUANT: CPT

## 2020-09-08 PROCEDURE — 80053 COMPREHEN METABOLIC PANEL: CPT

## 2020-09-08 PROCEDURE — 36415 COLL VENOUS BLD VENIPUNCTURE: CPT

## 2020-09-08 PROCEDURE — 83735 ASSAY OF MAGNESIUM: CPT

## 2020-09-08 PROCEDURE — 93010 ELECTROCARDIOGRAM REPORT: CPT | Performed by: INTERNAL MEDICINE

## 2020-09-08 PROCEDURE — 2580000003 HC RX 258: Performed by: NURSE PRACTITIONER

## 2020-09-08 PROCEDURE — 84443 ASSAY THYROID STIM HORMONE: CPT

## 2020-09-08 PROCEDURE — 85025 COMPLETE CBC W/AUTO DIFF WBC: CPT

## 2020-09-08 RX ORDER — SODIUM CHLORIDE 0.9 % (FLUSH) 0.9 %
10 SYRINGE (ML) INJECTION PRN
Status: DISCONTINUED | OUTPATIENT
Start: 2020-09-08 | End: 2020-09-11 | Stop reason: HOSPADM

## 2020-09-08 RX ORDER — 0.9 % SODIUM CHLORIDE 0.9 %
80 INTRAVENOUS SOLUTION INTRAVENOUS ONCE
Status: COMPLETED | OUTPATIENT
Start: 2020-09-08 | End: 2020-09-08

## 2020-09-08 RX ADMIN — IOVERSOL 75 ML: 741 INJECTION INTRA-ARTERIAL; INTRAVENOUS at 17:14

## 2020-09-08 RX ADMIN — SODIUM CHLORIDE 80 ML: 9 INJECTION, SOLUTION INTRAVENOUS at 17:14

## 2020-09-08 RX ADMIN — Medication 10 ML: at 17:14

## 2020-09-08 NOTE — TELEPHONE ENCOUNTER
----- Message from ARIEL Pacheco CNP sent at 9/7/2020  6:48 PM EDT -----  Regarding: Allergist notes  Can we please request notes from previous allergist, Dr. Michelle Lemus- [phone]. Thanks!

## 2020-09-09 ENCOUNTER — OFFICE VISIT (OUTPATIENT)
Dept: FAMILY MEDICINE CLINIC | Age: 75
End: 2020-09-09
Payer: MEDICARE

## 2020-09-09 VITALS
BODY MASS INDEX: 31.73 KG/M2 | TEMPERATURE: 97.3 F | HEART RATE: 81 BPM | RESPIRATION RATE: 16 BRPM | WEIGHT: 187 LBS | SYSTOLIC BLOOD PRESSURE: 148 MMHG | DIASTOLIC BLOOD PRESSURE: 70 MMHG | OXYGEN SATURATION: 98 %

## 2020-09-09 PROBLEM — N94.9 DISORDER OF FEMALE GENITAL ORGAN: Status: ACTIVE | Noted: 2020-09-09

## 2020-09-09 PROBLEM — Z80.49 FAMILY HISTORY OF MALIGNANT NEOPLASM OF UTERUS: Status: ACTIVE | Noted: 2020-09-09

## 2020-09-09 PROBLEM — L30.9 ECZEMA: Status: ACTIVE | Noted: 2020-09-09

## 2020-09-09 PROBLEM — J30.9 ALLERGIC RHINITIS: Status: ACTIVE | Noted: 2020-09-09

## 2020-09-09 PROBLEM — M85.80 OSTEOPENIA: Status: ACTIVE | Noted: 2020-09-09

## 2020-09-09 PROBLEM — N85.4 RETROVERTED UTERUS: Status: ACTIVE | Noted: 2020-09-09

## 2020-09-09 PROBLEM — E55.9 VITAMIN D DEFICIENCY: Status: ACTIVE | Noted: 2020-09-09

## 2020-09-09 PROBLEM — N83.202 CYST OF LEFT OVARY: Status: ACTIVE | Noted: 2020-09-09

## 2020-09-09 PROBLEM — E78.89 ELEVATED HDL: Status: ACTIVE | Noted: 2020-09-09

## 2020-09-09 PROBLEM — M62.830 SPASM OF BACK MUSCLES: Status: ACTIVE | Noted: 2020-09-09

## 2020-09-09 PROBLEM — N84.0 ENDOMETRIAL POLYP: Status: ACTIVE | Noted: 2020-09-09

## 2020-09-09 PROBLEM — N95.9 PERIMENOPAUSAL DISORDER: Status: ACTIVE | Noted: 2020-09-09

## 2020-09-09 PROBLEM — M81.0 AGE RELATED OSTEOPOROSIS: Status: ACTIVE | Noted: 2020-09-09

## 2020-09-09 PROBLEM — N88.2 STENOSIS OF CERVIX: Status: ACTIVE | Noted: 2020-09-09

## 2020-09-09 PROBLEM — N95.2 ATROPHY OF VAGINA: Status: ACTIVE | Noted: 2020-09-09

## 2020-09-09 PROBLEM — I10 ESSENTIAL HYPERTENSION: Status: ACTIVE | Noted: 2020-09-09

## 2020-09-09 PROBLEM — L50.9 URTICARIA: Status: ACTIVE | Noted: 2020-09-09

## 2020-09-09 PROBLEM — R53.83 FATIGUE: Status: ACTIVE | Noted: 2020-09-09

## 2020-09-09 PROCEDURE — 99214 OFFICE O/P EST MOD 30 MIN: CPT | Performed by: NURSE PRACTITIONER

## 2020-09-09 RX ORDER — OMEPRAZOLE 20 MG/1
20 TABLET, DELAYED RELEASE ORAL DAILY
Qty: 30 TABLET | Refills: 0
Start: 2020-09-09 | End: 2020-09-11 | Stop reason: SDUPTHER

## 2020-09-09 RX ORDER — CALCIUM CARBONATE 500(1250)
500 TABLET ORAL DAILY
COMMUNITY

## 2020-09-09 ASSESSMENT — ENCOUNTER SYMPTOMS
CONSTIPATION: 0
NAUSEA: 0
BACK PAIN: 0
SHORTNESS OF BREATH: 1
ABDOMINAL DISTENTION: 0
RHINORRHEA: 0
ABDOMINAL PAIN: 0
SORE THROAT: 0
COUGH: 1
COLOR CHANGE: 0
DIARRHEA: 0
CHEST TIGHTNESS: 0

## 2020-09-09 NOTE — PROGRESS NOTES
Visit Information    Have you changed or started any medications since your last visit including any over-the-counter medicines, vitamins, or herbal medicines? no   Have you stopped taking any of your medications? Is so, why? -  no  Are you having any side effects from any of your medications? - no    Have you seen any other physician or provider since your last visit?  no   Have you had any other diagnostic tests since your last visit?  no   Have you been seen in the emergency room and/or had an admission in a hospital since we last saw you?  no   Have you had your routine dental cleaning in the past 6 months?  yes - 09/2020      Do you have an active MyChart account? If no, what is the barrier?   Yes    Patient Care Team:  ARIEL Obrien CNP as PCP - General (Nurse Practitioner Family)  ARIEL Obrien CNP as PCP - [facility] Provider  Candelaria Godfrey MD as Consulting Physician (Gastroenterology)    Medical History Review  Past Medical, Family, and Social History reviewed and does contribute to the patient presenting condition    Health Maintenance   Topic Date Due    Colon cancer screen colonoscopy  11/05/1994    DEXA (modify frequency per FRAX score)  07/10/2021 (Originally 11/5/1999)    Annual Wellness Visit (AWV)  01/08/2021    Lipid screen  07/08/2021    Potassium monitoring  09/08/2021    Creatinine monitoring  09/08/2021    DTaP/Tdap/Td vaccine (3 - Td) 11/18/2029    Flu vaccine  Completed    Shingles Vaccine  Completed    Pneumococcal 65+ years Vaccine  Completed    Hepatitis A vaccine  Aged Out    Hepatitis B vaccine  Aged Out    Hib vaccine  Aged Out    Meningococcal (ACWY) vaccine  Aged Out

## 2020-09-09 NOTE — PROGRESS NOTES
aortic root dimension    Carotid arteries 10/17/2019 summary   Mild 16-49% stenosis of the internal carotid arteries bilaterally.     Patent vertebral arteries with antegrade flow.           EKG 9/8/2020 -Normal sinus rhythm    D-dimer 9/8/2020 0.64    CT chest 9/8/2020   No evidence of pulmonary embolism or acute pulmonary abnormality.         4 x 8 mm lung nodule in the right middle lobe.  Follow-up recommendation is    listed below.         Small hiatal hernia.         RECOMMENDATIONS:    Fleischner Society guidelines for follow-up and management of incidentally    detected pulmonary nodules:         Single Solid Nodule:         Nodule size equals 6-8 mm    In a low-risk patient, CT at 6-12 months, then consider CT at 18-24 months.            - Low risk patients include individuals with minimal or absent history of    smoking and other known risk factors.           PFT 1/21/2020- normal     The patient's past medical, surgical, social, and family history as well as his current medications and allergies were reviewed as documented in today's encounter. Current Outpatient Medications on File Prior to Visit   Medication Sig Dispense Refill    fluticasone (FLONASE) 50 MCG/ACT nasal spray 2 sprays by Nasal route daily 1 Bottle 0    guaiFENesin (MUCINEX) 600 MG extended release tablet Take 1-2 tablets by mouth 2 times daily as needed for Congestion 60 tablet 0    benzonatate (TESSALON PERLES) 100 MG capsule Take 1 capsule by mouth 3 times daily as needed for Cough 30 capsule 0    Coenzyme Q10 (COQ10) 100 MG CAPS Take 1 capsule by mouth daily       simethicone (MYLICON) 80 MG chewable tablet Take 1 tablet by mouth 4 times daily as needed for Flatulence 180 tablet 3    hydrocortisone (ANUSOL-HC) 2.5 % rectal cream Place rectally 2 times daily.  1 Tube 1    atorvastatin (LIPITOR) 20 MG tablet Take 1 tablet by mouth daily 90 tablet 1    PROAIR  (90 Base) MCG/ACT inhaler Inhale 2 puffs into the lungs every 4-6 hours as needed for Wheezing or Shortness of Breath (Patient not taking: Reported on 9/4/2020) 1 Inhaler 0    loratadine (CLARITIN) 10 MG tablet Take 10 mg by mouth daily as needed (Seasonal allergies)       Biotin 1000 MCG TABS Take 1,000 mcg by mouth daily        Current Facility-Administered Medications on File Prior to Visit   Medication Dose Route Frequency Provider Last Rate Last Dose    sodium chloride flush 0.9 % injection 10 mL  10 mL Intravenous PRN Suella Pollen, APRN - CNP   10 mL at 09/08/20 7622        Subjective:     Review of Systems   Constitutional: Negative for activity change, fatigue and fever. HENT: Positive for postnasal drip and sneezing. Negative for congestion, ear pain, rhinorrhea, sore throat and tinnitus. Respiratory: Positive for cough (persistent) and shortness of breath (with exertion). Negative for chest tightness. Cardiovascular: Negative for chest pain and palpitations. Gastrointestinal: Negative for abdominal distention, abdominal pain, constipation, diarrhea and nausea. Endocrine: Negative for polydipsia, polyphagia and polyuria. Genitourinary: Negative for difficulty urinating and dysuria. Musculoskeletal: Negative for arthralgias, back pain and myalgias. Arthritis left hip   Skin: Negative for color change and rash. Neurological: Positive for light-headedness (with excercising/ walking and hot weather). Negative for dizziness, weakness and headaches. Hematological: Negative for adenopathy. Psychiatric/Behavioral: Negative for agitation and behavioral problems. The patient is not nervous/anxious. Tearful and nervous to discuss results       Objective:     Physical Exam  Vitals signs reviewed. Constitutional:       General: She is not in acute distress. Appearance: Normal appearance. HENT:      Head: Normocephalic and atraumatic.       Right Ear: Tympanic membrane, ear canal and external ear normal.      Left Ear: Tympanic membrane, ear canal and external ear normal. There is impacted cerumen (removed curette). Nose: Nose normal.      Right Turbinates: Swollen. Left Turbinates: Swollen. Right Sinus: No maxillary sinus tenderness or frontal sinus tenderness. Left Sinus: No maxillary sinus tenderness or frontal sinus tenderness. Mouth/Throat:      Lips: Pink. Mouth: Mucous membranes are moist.      Pharynx: No oropharyngeal exudate or posterior oropharyngeal erythema. Eyes:      Extraocular Movements: Extraocular movements intact. Conjunctiva/sclera: Conjunctivae normal.      Pupils: Pupils are equal, round, and reactive to light. Neck:      Musculoskeletal: Normal range of motion. Cardiovascular:      Rate and Rhythm: Normal rate and regular rhythm. Pulses: Normal pulses. Heart sounds: Normal heart sounds. No murmur. Pulmonary:      Effort: Pulmonary effort is normal. No respiratory distress. Breath sounds: Normal breath sounds. No wheezing or rales. Abdominal:      General: Bowel sounds are normal. There is no distension. Palpations: Abdomen is soft. Tenderness: There is no abdominal tenderness. Musculoskeletal: Normal range of motion. Right lower leg: No edema. Left lower leg: No edema. Lymphadenopathy:      Cervical: No cervical adenopathy. Skin:     General: Skin is warm and dry. Neurological:      General: No focal deficit present. Mental Status: She is alert and oriented to person, place, and time. Deep Tendon Reflexes: Reflexes normal.   Psychiatric:         Attention and Perception: Attention normal.         Mood and Affect: Mood is anxious. Affect is tearful. Behavior: Behavior normal. Behavior is cooperative. Comments: Tearful and anxious at beginning of visit but after reviewing labs she was fine and mood improved. BP elevated today, patient was anxious and nervous about appt today.  Will continue to monitor closely. Assessment:      Diagnosis Orders   1. Lung nodule, solitary- Right middle lobe  CT CHEST W CONTRAST   2. Elevated d-dimer     3. Lightheadedness     4. Hiatal hernia  omeprazole (PRILOSEC OTC) 20 MG tablet   5. Chronic bronchitis, unspecified chronic bronchitis type ([address]     6. Asthma due to seasonal allergies     7. Cough, persistent  omeprazole (PRILOSEC OTC) 20 MG tablet   8. Hyperlipidemia, unspecified hyperlipidemia type       Reviewed all labs and diagnostic test.   Plan:     Chronic bronchitis, unspecified chronic bronchitis type ([address]  Asthma due to seasonal allergies  Cough, persistent  Hiatal hernia  -due to symptoms of cough precipitated after meals and has small hiatal hernia will start Prilosec OTC daily, call in 2 weeks and update on if symptoms have improved. -Encouraged to take Claritin and use Flonase daily for allergies. - Education given on  proper use of Flonase and inhaler. Elevated d-dimer  Lung nodule, solitary- Right middle lobe  - results were reviewed with patient. -repeat CT scan ordered to have completed in 6-12 months for follow up    Lightheadedness  -discussed possible causes of lightheadedness including hyperventilation, anxiety and stress. - her stress has been high lately- Her OBGYN has decided to do a radical hysterectomy d/t family hx of CA -Has 2 sisters dx'd with endometrial CA- one has passed away and one currently in treatment.  -She will be following up with genetic counselor at the end of September as well.   -went over proper breathing techniques and education given. - increase fluid intake. Hyperlipidemia  -Stopped Lipitor   - will try diet modification and exercise: discussed patient decreasing fats, carbohydrates, and engage in a healthier diet overall, as well as routine exercise.   -will re-evaluate lipids in 6 months. Rest of systems unchanged, continue current treatments.     Medications, labs, diagnostic studies, consultations and follow-up as documented in this encounter.  Rest of systems unchanged, continue current treatments    ARIEL Pablo-CNP

## 2020-09-11 ENCOUNTER — TELEPHONE (OUTPATIENT)
Dept: FAMILY MEDICINE CLINIC | Age: 75
End: 2020-09-11

## 2020-09-11 RX ORDER — OMEPRAZOLE 20 MG/1
20 TABLET, DELAYED RELEASE ORAL DAILY
Qty: 90 TABLET | Refills: 3 | Status: SHIPPED | OUTPATIENT
Start: 2020-09-11 | End: 2020-12-18

## 2020-09-11 NOTE — TELEPHONE ENCOUNTER
Patient called stating the pharmacy never received her prescription for Omeprazole. Can it be sent again please?   [facility] [address]

## 2020-09-18 ENCOUNTER — OFFICE VISIT (OUTPATIENT)
Dept: GASTROENTEROLOGY | Age: 75
End: 2020-09-18
Payer: MEDICARE

## 2020-09-18 VITALS — WEIGHT: 188.6 LBS | BODY MASS INDEX: 32 KG/M2

## 2020-09-18 PROCEDURE — 99213 OFFICE O/P EST LOW 20 MIN: CPT | Performed by: INTERNAL MEDICINE

## 2020-09-18 ASSESSMENT — ENCOUNTER SYMPTOMS
EYES NEGATIVE: 1
VOICE CHANGE: 0
RECTAL PAIN: 0
ANAL BLEEDING: 0
ABDOMINAL DISTENTION: 0
GASTROINTESTINAL NEGATIVE: 1
CONSTIPATION: 0
NAUSEA: 0
SINUS PRESSURE: 0
BACK PAIN: 0
VOMITING: 0
CHOKING: 1
ABDOMINAL PAIN: 0
COUGH: 1
SORE THROAT: 0
TROUBLE SWALLOWING: 0
ALLERGIC/IMMUNOLOGIC NEGATIVE: 1
WHEEZING: 0
DIARRHEA: 0
BLOOD IN STOOL: 0

## 2020-09-18 NOTE — PROGRESS NOTES
GI FOLLOW UP    INTERVAL HISTORY:       No referring provider defined for this encounter. Chief Complaint   Patient presents with    Hemorrhoids     minimal bleeding she says    GI Problem     had CT scan and found hiatal hernia           HISTORY OF PRESENT ILLNESS: Ms.Margaret MARITZA Colbert is a 75 y. o. female with a past history remarkable for arthritis, HTN (diet controlled) , referred for evaluation of fecal incontinence and blood in the stool. She has been on the [employer] diet and has been extremely compliant with her regimen, since onset of diet has noted significant bloating, abdominal distention and increased fecal urgency. Advised cessation of this particular diet.      Patient was observed significant clinical improvement with relationship to her bloating abdominal distention and fecal urgency while avoiding the gas producing foods from the list provided on the last visit. Celiac disease panel was negative  Comprehensive food allergy panel was negative  Basic lab work was unremarkable    Clinically the patient is doing very well from a GI standpoint and with her dietary modifications and restrictions. She is yet to see a nutritionist/dietitian to aid in her weight loss. She has gained a few pound since her last visit in March. She was able to get in contact with genetic counseling and was given a packet for filling to complete her initial evaluation prior to testing for Hancock syndrome. Past Medical,Family, and Social History reviewed and does contribute to the patient presenting condition. Patient's PMH/PSH,SH,PSYCH Hx, MEDs, ALLERGIES, and ROS were all reviewed and updated in the appropriate sections.     PAST MEDICAL HISTORY:  Past Medical History:   Diagnosis Date    Arthritis     Blood in stool 12/4/2019    CMV (cytomegalovirus infection) ([facility] [address]     Hypertension        Past Surgical History:   Procedure Laterality Date    CERVICAL FUSION      DILATION AND CURETTAGE OF UTERUS  09/05/2019    TOTAL HIP ARTHROPLASTY Right 2013       CURRENT MEDICATIONS:    Current Outpatient Medications:     omeprazole (PRILOSEC OTC) 20 MG tablet, Take 1 tablet by mouth daily, Disp: 90 tablet, Rfl: 3    calcium carbonate (OSCAL) 500 MG TABS tablet, Take 500 mg by mouth daily, Disp: , Rfl:     fluticasone (FLONASE) 50 MCG/ACT nasal spray, 2 sprays by Nasal route daily, Disp: 1 Bottle, Rfl: 0    guaiFENesin (MUCINEX) 600 MG extended release tablet, Take 1-2 tablets by mouth 2 times daily as needed for Congestion, Disp: 60 tablet, Rfl: 0    Coenzyme Q10 (COQ10) 100 MG CAPS, Take 1 capsule by mouth daily , Disp: , Rfl:     simethicone (MYLICON) 80 MG chewable tablet, Take 1 tablet by mouth 4 times daily as needed for Flatulence, Disp: 180 tablet, Rfl: 3    hydrocortisone (ANUSOL-HC) 2.5 % rectal cream, Place rectally 2 times daily. , Disp: 1 Tube, Rfl: 1    PROAIR  (90 Base) MCG/ACT inhaler, Inhale 2 puffs into the lungs every 4-6 hours as needed for Wheezing or Shortness of Breath, Disp: 1 Inhaler, Rfl: 0    loratadine (CLARITIN) 10 MG tablet, Take 10 mg by mouth daily as needed (Seasonal allergies) , Disp: , Rfl:     Biotin 1000 MCG TABS, Take 1,000 mcg by mouth daily , Disp: , Rfl:     ALLERGIES:   Allergies   Allergen Reactions    Oxycodone        FAMILY HISTORY:       Problem Relation Age of Onset    Stroke Mother     Heart Attack Father     Endometrial Cancer Sister 76    Colon Cancer Sister     Colon Cancer Sister         Endometrial CA possibly spread to colon    Endometrial Cancer Sister 70    Other Paternal Grandmother         RA         SOCIAL HISTORY:   Social History     Socioeconomic History    Marital status:      Spouse name: Not on file    Number of children: Not on file    Years of education: Not on file    Highest education level: Not on file Occupational History    Not on file   Social Needs    Financial resource strain: Not on file    Food insecurity     Worry: Not on file     Inability: Not on file    Transportation needs     Medical: Not on file     Non-medical: Not on file   Tobacco Use    Smoking status: Never Smoker    Smokeless tobacco: Never Used   Substance and Sexual Activity    Alcohol use: Yes     Comment: 2 x monthly    Drug use: No    Sexual activity: Not Currently   Lifestyle    Physical activity     Days per week: Not on file     Minutes per session: Not on file    Stress: Not on file   Relationships    Social connections     Talks on phone: Not on file     Gets together: Not on file     Attends Hindu service: Not on file     Active member of club or organization: Not on file     Attends meetings of clubs or organizations: Not on file     Relationship status: Not on file    Intimate partner violence     Fear of current or ex partner: Not on file     Emotionally abused: Not on file     Physically abused: Not on file     Forced sexual activity: Not on file   Other Topics Concern    Not on file   Social History Narrative    Not on file       REVIEW OF SYSTEMS: A 12-point review of systems was obtained and pertinent positives and negatives were listed below. REVIEW OF SYSTEMS:     Constitutional: No fever, no chills, no lethargy, no weakness. HEENT:  No headache, otalgia, itchy eyes, nasal discharge or sore throat. Cardiac:  No chest pain, dyspnea, orthopnea or PND. Chest:   No cough, phlegm or wheezing. Abdomen:      Detailed by MA   Neuro:  No focal weakness, abnormal movements or seizure like activity. Skin:   No rashes, no itching. :   No hematuria, no pyuria, no dysuria, no flank pain. Extremities:  No swelling or joint pains. ROS was otherwise negative    Review of Systems    PHYSICAL EXAMINATION: Vital signs reviewed per the nursing documentation.      Wt 188 lb 9.6 oz (85.5 kg)   BMI 32.00 kg/m² Body mass index is 32 kg/m². Physical Exam    Physical Exam   Constitutional: Patient is oriented to person, place, and time. Patient appears well-developed and well-nourished. HENT:   Head: Normocephalic and atraumatic. Eyes: Pupils are equal, round, and reactive to light. EOM are normal.   Neck: Normal range of motion. Neck supple. No JVD present. No tracheal deviation present. No thyromegaly present. Cardiovascular: Normal rate, regular rhythm, normal heart sounds and intact distal pulses. Pulmonary/Chest: Effort normal and breath sounds normal. No stridor. No respiratory distress. He has no wheezes. He has no rales. He exhibits no tenderness. Abdominal: Soft. Bowel sounds are normal. He exhibits no distension and no mass. There is no tenderness. There is no rebound and no guarding. No hernia. Musculoskeletal: Normal range of motion. Lymphadenopathy:    Patient has no cervical adenopathy. Neurological: Patient is alert and oriented to person, place, and time. Psychiatric: Patient has a normal mood and affect.  Patient behavior is normal.       LABORATORY DATA: Reviewed  Lab Results   Component Value Date    WBC 5.4 09/08/2020    HGB 13.8 09/08/2020    HCT 41.9 09/08/2020    MCV 87.3 09/08/2020     09/08/2020     (H) 09/08/2020    K 4.4 09/08/2020     (H) 09/08/2020    CO2 20 09/08/2020    BUN 18 09/08/2020    CREATININE 0.75 09/08/2020    LABALBU 4.3 09/08/2020    BILITOT 0.68 09/08/2020    ALKPHOS 89 09/08/2020    AST 19 09/08/2020    ALT 19 09/08/2020    INR 1.4 07/20/2015         Lab Results   Component Value Date    RBC 4.80 09/08/2020    HGB 13.8 09/08/2020    MCV 87.3 09/08/2020    MCH 28.8 09/08/2020    MCHC 32.9 09/08/2020    RDW 13.5 09/08/2020    MPV 10.2 09/08/2020    BASOPCT 1 09/08/2020    LYMPHSABS 2.16 09/08/2020    MONOSABS 0.60 09/08/2020    NEUTROABS 2.46 09/08/2020    EOSABS 0.08 09/08/2020    BASOSABS 0.04 09/08/2020         DIAGNOSTIC TESTING:     Xr Chest (2 Vw)    Result Date: 9/8/2020  EXAMINATION: TWO XRAY VIEWS OF THE CHEST 9/8/2020 2:35 pm COMPARISON: Chest radiograph performed 11/15/2019. HISTORY: ORDERING SYSTEM PROVIDED HISTORY: Chronic bronchitis, unspecified chronic bronchitis type ([address] TECHNOLOGIST PROVIDED HISTORY: Chronic cough/bronchitis Reason for Exam: cough Acuity: Chronic Type of Exam: Initial Additional signs and symptoms: SOB Relevant Medical/Surgical History: hx HTN FINDINGS: There is no acute consolidation or effusion. There is no pneumothorax. The mediastinal structures are unremarkable. The upper abdomen is unremarkable. The extrathoracic soft tissues are unremarkable. There is no acute osseous abnormality. No acute cardiopulmonary process. Ct Chest Pulmonary Embolism W Contrast    Result Date: 9/8/2020  EXAMINATION: CTA OF THE CHEST 9/8/2020 4:23 pm TECHNIQUE: CTA of the chest was performed after the administration of intravenous contrast.  Multiplanar reformatted images are provided for review. MIP images are provided for review. Dose modulation, iterative reconstruction, and/or weight based adjustment of the mA/kV was utilized to reduce the radiation dose to as low as reasonably achievable. COMPARISON: None. HISTORY: ORDERING SYSTEM PROVIDED HISTORY: Elevated d-dimer TECHNOLOGIST PROVIDED HISTORY: R/o PE Reason for Exam: SOB Acuity: Acute Type of Exam: Initial Additional signs and symptoms: elevated D-dimer Relevant Medical/Surgical History: hx HTN FINDINGS: Pulmonary Arteries: Pulmonary arteries are adequately opacified for evaluation. No evidence of intraluminal filling defect to suggest pulmonary embolism. Main pulmonary artery is normal in caliber. Mediastinum: There are calcified right hilar lymph nodes, likely related to prior granulomatous disease no evidence of mediastinal lymphadenopathy. The heart and pericardium demonstrate no acute abnormality. There is no acute abnormality of the thoracic aorta. Lungs/pleura: There is minimal dependent and discoid atelectasis at the bilateral lung bases. There is a 4 x 8 mm lung nodule in the right middle lobe (series 4, image 65). No focal consolidation or pulmonary edema. No evidence of pleural effusion or pneumothorax. Upper Abdomen: There is a small hiatal.  There is a 3 mm low-attenuation lesion in the right lobe of the liver at the liver dome (series 5, image 167), too small to characterize, statistically likely related to represent a cyst or hemangioma. Limited images of the upper abdomen are otherwise unremarkable. Soft Tissues/Bones: No acute bone or soft tissue abnormality. No evidence of pulmonary embolism or acute pulmonary abnormality. 4 x 8 mm lung nodule in the right middle lobe. Follow-up recommendation is listed below. Small hiatal hernia. RECOMMENDATIONS: Fleischner Society guidelines for follow-up and management of incidentally detected pulmonary nodules: Single Solid Nodule: Nodule size equals 6-8 mm In a low-risk patient, CT at 6-12 months, then consider CT at 18-24 months. In a high-risk patient, CT at 6-12 months, then CT at 18-24 months. - Low risk patients include individuals with minimal or absent history of smoking and other known risk factors. - High risk patients include individuals with a history or smoking or known risk factors. Radiology 2017 http://pubs. rsna.org/doi/full/10.1148/radiol. 5240685716          IMPRESSION:    Ms. Franklyn Kumarles a 75 y. o. female with a past history remarkable for arthritis, HTN (diet controlled) , referred for evaluation of fecal incontinence and blood in the stool. She has been on the [employer]" health diet and has been extremely compliant with her regimen, since onset of diet has noted significant bloating, abdominal distention and increased fecal urgency.  Advised cessation of this particular diet.       PLAN:    1) Exertional dyspnea-this appears to be relatively new clinical symptom and that has to be evaluated by her primary care physician and potentially cardiologist.  Patient underwent evaluation in the emergency department and had a CT chest which revealed a small hiatal hernia that appears to be noncontributory. Patient to continue with Prilosec as advised by the primary care physician however doubt this is going to provide significant relief of her symptoms. We will continue to monitor while on this medication. Strong indication for upper endoscopy or colonoscopy at this time    2) Bowel movements appear to be normal.  Patient had a colonoscopy approximately 6 years ago and is due for next colonoscopy proximately 4 years per record. Patient is undergoing a hysterectomy in the coming months. Genetic testing to be followed by patient. 3) RTC in 3 months. Thank you for allowing me to participate in the care of Ms. Colbert. For any further questions please do not hesitate to contact me. I have reviewed and agree with the ROS entered by the MA/LPN from today's encounter documented in a separate note. Ary Ramachandran MD, MPH   [facility]  Office #: [phone]    this note is created with the assistance of a speech recognition program.  While intending to generate a document that actually reflects the content of the visit, the document can still have some errors including those of syntax and sound a like substitutions which may escape proof reading. It such instances, actual meaning can be extrapolated by contextual diversion.

## 2020-09-18 NOTE — PROGRESS NOTES
Review of Systems   Constitutional: Negative. Negative for appetite change, fatigue and unexpected weight change. HENT: Negative. Negative for dental problem, postnasal drip, sinus pressure, sore throat, trouble swallowing and voice change. Eyes: Negative. Negative for visual disturbance. Respiratory: Positive for cough and choking (off and on). Negative for wheezing. Cardiovascular: Negative. Negative for chest pain, palpitations and leg swelling. Gastrointestinal: Negative. Negative for abdominal distention (Improved), abdominal pain, anal bleeding, blood in stool, constipation, diarrhea, nausea, rectal pain and vomiting. Endocrine: Negative. Genitourinary: Negative. Negative for difficulty urinating. Musculoskeletal: Positive for arthralgias and myalgias. Negative for back pain and gait problem. Allergic/Immunologic: Negative. Negative for environmental allergies and food allergies. Neurological: Positive for numbness. Negative for dizziness, weakness, light-headedness and headaches. Hematological: Negative. Does not bruise/bleed easily. Psychiatric/Behavioral: Positive for sleep disturbance. The patient is not nervous/anxious. All other systems reviewed and are negative.

## 2020-09-29 ENCOUNTER — INITIAL CONSULT (OUTPATIENT)
Dept: ONCOLOGY | Age: 75
End: 2020-09-29
Payer: MEDICARE

## 2020-09-29 PROCEDURE — 96040 PR GENETIC COUNSELING, EACH 30 MIN: CPT | Performed by: GENETIC COUNSELOR, MS

## 2020-09-29 NOTE — PROGRESS NOTES
[facility]   Hereditary Cancer Risk Assessment     Name: Laverne Pinedo   YOB: 1945   Date of Consultation: 20     Ms. Flo Tirado was seen at the [facility] for genetic counseling on 20. Ms. Flo Tirado was referred by YAEL Taylor due to her family history of cancer. PERSONAL HISTORY   Ms. Flo Tirado is a 75 y.o.  female with no personal history of cancer. Ms. Flo Tirado reports menarche at age 14, is nulliparous, and underwent menopause in her 35s. Ms. Flo Tirado has never had a hysterectomy and both ovaries are intact. FAMILY HISTORY  Ms. Flo Tirado has two full sisters who have been diagnosed with endometrial cancer both in their 76s. Ms. Cory Mena mother passed away at age Adonis  1560 from natural causes. Her maternal grandparents lived into their 76s and did not have a history of cancer. She has one maternal aunt and one maternal uncle, both lived into their [de-identified] without cancer. Ms. Cory Mena father passed away at age 59 from a heart attack. She has one paternal aunt with breast cancer diagnosed over age 48. Ms. Flo Tirado reports  ancestry and denies any known Ashkenazi Sabianist heritage. RISK ASSESSMENT   We discussed that approximately 5-10% of cancers are due to a hereditary gene mutation which causes an increased risk for certain cancers. Hereditary cancers are typically diagnosed at younger ages (under age 46y) and occur in multiple generations of a family. Multiple individuals with the same type of cancer (example: breast or colorectal) or uncommon cancers (example: ovarian, pancreatic, male breast cancer) are also features of hereditary cancers. We discussed that Ms. Colbert's family history is not highly concerning for a hereditary predisposition.  While she has two close relatives with endometrial cancer given that she has two first degree relatives diagnosed with endometrial cancer recently. She was originally told that her family history was concerning for Hancock syndrome. However, Ms. Lorne Guajardo presented with a tumor test which showed that mismatch repair analysis by [facility] showed the presence of MLH1, MSH2, MSH6 and PMS2 proteins suggesting a low likelihood of a hancock syndrome gene mutation. We do not have records from her other sister's cancer to review. Thus, Ms. Lorne Guajardo does not meet the NCCN guidelines for genetic testing with just two relatives with hancock syndrome related cancer over age 48. DISCUSSION  We discussed that the Hancock syndrome genes (MLH1, MSH2, MSH6, and PMS2) are the most common genes associated with hereditary colorectal and endometrial cancer. We also discussed that genetic testing is available for multiple other genes related to hereditary cancer. Some of these genes are known to carry a significant increased risk for several cancers including colon, breast, uterine, ovarian, stomach, and pancreatic cancer, while some of these genes are believed to have a moderate increased risk for breast and other cancers. We discussed the possibility of finding a mutation in genes with limited information to guide medical management, as well we as the possibility of identifying variants of uncertain significance (VUS). We discussed the risks, benefits, and limitations of genetic testing. Possible test results were discussed as well as potential screening and prevention strategies. Specifically, we the recommendation for preventive hysterectomy and/or bilateral salpingo oophorectomy for results which suggest an increased risk for endometrial and ovarian cancer. Lastly, we discussed that the results of Ms. Colbert's genetic testing may be beneficial in defining her risk for cancer as well as for her family members. SUMMARY & PLAN  1) Ms. Lorne Guajardo does not currently meet the NCCN guidelines for genetic testing based on having just two relatives with endometrial cancer over age 50. In addition, her sister's tumor testing showed normal mismatch repair analysis (MMR) which suggests a low likelihood of Hancock syndrome. Thus, the chance that Ms. Megan Curtis carries a hereditary gene mutation is low. 2) Genetic testing was offered to Ms. Colbert with the knowledge that she does not meet NCCN guidelines or her insurance criteria for testing. Genetic testing is available for an out of pocket cost of $249. She elected to proceed with the [facility] Expanded + RNA Insight gene panel. 3) Informed consent was obtained and a blood sample was sent to [facility]. We will call Ms. Colbert with results as soon as they are available. A follow up appointment may be recommended. A summary letter with results and final medical management recommendations will be sent once available. A total of 30 minutes were spent face to face with Ms. Colbert and 50% of the time was spent educating and counseling. The [address] [place] [address] National Program would be glad to offer our assistance should you have any questions or concerns about this information. Please feel free to contact us at [phone]. Moses Yoder.  MS Marifer, [facility]   Licensed Genetic Counselor

## 2020-10-21 ENCOUNTER — TELEPHONE (OUTPATIENT)
Dept: ONCOLOGY | Age: 75
End: 2020-10-21

## 2020-10-21 NOTE — TELEPHONE ENCOUNTER
Left message for Lizzie Jimenes notifying her that her genetic test results are available. Requested that she return my phone call at her earliest convenience to review results.

## 2020-10-26 ENCOUNTER — TELEPHONE (OUTPATIENT)
Dept: ONCOLOGY | Age: 75
End: 2020-10-26

## 2020-10-26 NOTE — TELEPHONE ENCOUNTER
3 [facility]"" Counseling Program   Hereditary Cancer Risk Assessment     Name: Marcia Noriega  YOB: 1945  Date of Results Disclosure: 10/26/20     HISTORY   Ms. Sasha Tinoco was seen for genetic counseling on 10/29/20 at the request of YAEL Carrera due to her family history of cancer. At that time, Ms. Colbert chose to pursue genetic testing via the [facility] Expanded + RNA gene panel. These results were discussed with Ms. Colbert on 10/26/20 via telephone. A summary of Ms. Colbert's results and recommendations are below. RESULTS  [facility] CancerNext-Expanded Panel + RNAinsight: NEGATIVE - NO CLINICALLY SIGNIFICANT MUTATIONS DETECTED   This panel included the analysis of 77 genes associated with hereditary cancer including: AIP, ALK, APC, SUSAN, BAP1, BARD1, BLM, BMPR1A, BRCA1, BRCA2, BRIP1, CDC73, CDH1, CDK4, CDKN1B, CDKN2A, CHEK2, CTNNA1, DICER1, EGFR, EGLN1, EPCAM, FANCC, FH, FLCN, GALNT12, GREM1, HOXB13, KIF1B, KIT1, LZTR1, MAX, MEN1, MET, MITF, MLH1, MSH2, MSH3, MSH6, MUTYH, NBN, NF1, NF2, NTHL1, PALB2, PDGFRA, PHOX2B, PMS2, POLD1, POLE, POT1, QJJVC1Q, PTCH1, PTEN, RAD51C, RAD51D, RB1, RECQL, RET, SDHA, SDHAF2, SDHB, SDHC, ,SDHD, SMAD4, SMARCA4, SMARCB1, SMARCE1, STK11, SUFU, CPJG804, TP53, TSC1, TSC2, VHL, and XRCC2. In addition, no clinically relevant aberrant RNA transcripts were detected in select genes. Please refer to genetic test report for technical details. We discussed that Ms. Colbert's negative test result greatly reduces the likelihood that she carries a hereditary gene mutation. However, it is possible that her family history of cancer is due to a hereditary mutation which she did not inherit. It is also possible that her family history of cancer may be due to a gene for which testing was not performed or which has yet to be discovered. RECOMMENDATIONS  1) The outcome of Ms.  Sayra's genetic test results do not affect her current cancer screening recommendations. She is recommended to continue surveillance as directed by her physicians. RECOMMENDATIONS FOR FAMILY MEMBERS   1) It is possible that the remaining cancers in Ms. Colbert's family are due to a hereditary gene mutation that she did not inherit. Therefore, her remaining sister and/or nieces and nephews may consider genetic counseling and testing to clarify this possibility. Relatives may contact the [address] [place] [address] at [phone] or locate a genetic counselor at [email or web address].     3) We encourage Ms. Colbert's relatives to discuss their family history of cancer with their physicians to determine the most appropriate cancer screening recommendations. SUMMARY & PLAN   1) Ms. Colbert's genetic test results are negative meaning there were no clinically significant mutations detected in the 77 genes analyzed. 2) There are no changes in medical management for Ms. Colbert based on her negative genetic test results. 3) We encourage Ms. Colbert to contact us every 1-2 years to determine if there are any new genetic testing or research options available. 4) We encourage Ms. Colbert to contact us with updates to her personal and/or familys cancer history as this information may alter our assessment and/or recommendations. The [address] [place] [address] would be glad to offer our assistance should you have any questions or concerns about this information. Please feel free to contact us at [phone]. [facility].  Zaheer Yoon MS, [facility]   Licensed Genetic Counselor         CC:  Ms. Abner Rocha MD

## 2020-12-17 NOTE — PROGRESS NOTES
Yeni Smith, APRN-CNP    [facility]  [id number]  [address] [address]  Dept: [phone]  Dept Fax: [phone]     Patient ID: Sebastian Adams is a 67 y.o. female. HPI    Pt here today for f/u on chronic medical problems HTN, HLD, vit D deficiency,allergic rhinitis and family hx of malignant neoplasm of uterus- with recent hysterectomy ,go over labs and/or diagnostic studies, and medication refills. Pt denies any fever or chills. Pt today denies any HA, chest pain, or SOB. Pt denies any N/V/D/C or abdominal pain. Stopped taking priolsec. Lightheadedness tends to come and go but has gotten better over time. SOB and cough has gotten better as well. She currently has no complaints. 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 Overall she is feeling much better, no new complaints at this time. Otherwise pt doing well on current tx and no other concerns today. The patient's past medical, surgical, social, and family history as well as his current medications and allergies were reviewed as documented in today's encounter IVANA Sepulveda. Current Outpatient Medications on File Prior to Visit   Medication Sig Dispense Refill    omeprazole (PRILOSEC OTC) 20 MG tablet Take 1 tablet by mouth daily 90 tablet 3    calcium carbonate (OSCAL) 500 MG TABS tablet Take 500 mg by mouth daily      fluticasone (FLONASE) 50 MCG/ACT nasal spray 2 sprays by Nasal route daily 1 Bottle 0    guaiFENesin (MUCINEX) 600 MG extended release tablet Take 1-2 tablets by mouth 2 times daily as needed for Congestion 60 tablet 0    Coenzyme Q10 (COQ10) 100 MG CAPS Take 1 capsule by mouth daily       simethicone (MYLICON) 80 MG chewable tablet Take 1 tablet by mouth 4 times daily as needed for Flatulence 180 tablet 3    hydrocortisone (ANUSOL-HC) 2.5 % rectal cream Place rectally 2 times daily.  1 Tube 1  PROAIR  (90 Base) MCG/ACT inhaler Inhale 2 puffs into the lungs every 4-6 hours as needed for Wheezing or Shortness of Breath 1 Inhaler 0    loratadine (CLARITIN) 10 MG tablet Take 10 mg by mouth daily as needed (Seasonal allergies)       Biotin 1000 MCG TABS Take 1,000 mcg by mouth daily        No current facility-administered medications on file prior to visit. Subjective:     Review of Systems   Constitutional: Negative for activity change, fatigue and fever. HENT: Negative for congestion, ear pain, rhinorrhea and sore throat. Respiratory: Negative for cough, chest tightness and shortness of breath. Cardiovascular: Negative for chest pain and palpitations. Gastrointestinal: Positive for constipation (stable). Negative for abdominal distention, abdominal pain, diarrhea and nausea. Endocrine: Negative for polydipsia, polyphagia and polyuria. Genitourinary: Negative for difficulty urinating and dysuria. Musculoskeletal: Positive for arthralgias and myalgias. Negative for back pain. Right hand first 2 fingers go numb   Skin: Negative for color change and rash. Neurological: Positive for light-headedness (improving). Negative for dizziness, weakness and headaches. Hematological: Negative for adenopathy. Psychiatric/Behavioral: Negative for agitation and behavioral problems. The patient is not nervous/anxious. Objective:     Physical Exam  Vitals signs reviewed. Constitutional:       General: She is not in acute distress. Appearance: Normal appearance. HENT:      Head: Normocephalic and atraumatic. Right Ear: External ear normal.      Left Ear: External ear normal.      Nose: Nose normal.      Mouth/Throat:      Mouth: Mucous membranes are moist.      Pharynx: No oropharyngeal exudate or posterior oropharyngeal erythema. Eyes:      Extraocular Movements: Extraocular movements intact.       Conjunctiva/sclera: Conjunctivae normal. Pupils: Pupils are equal, round, and reactive to light. Neck:      Musculoskeletal: Normal range of motion. Cardiovascular:      Rate and Rhythm: Normal rate and regular rhythm. Pulses: Normal pulses. Heart sounds: Normal heart sounds. No murmur. Pulmonary:      Effort: Pulmonary effort is normal. No respiratory distress. Breath sounds: Normal breath sounds. No wheezing or rales. Abdominal:      General: Bowel sounds are normal. There is no distension. Palpations: Abdomen is soft. Tenderness: There is no abdominal tenderness. Musculoskeletal: Normal range of motion. Right lower leg: No edema. Left lower leg: No edema. Lymphadenopathy:      Cervical: No cervical adenopathy. Skin:     General: Skin is warm and dry. Neurological:      General: No focal deficit present. Mental Status: She is alert and oriented to person, place, and time. Deep Tendon Reflexes: Reflexes normal.   Psychiatric:         Mood and Affect: Mood normal.         Behavior: Behavior normal. Behavior is cooperative. Assessment:      Diagnosis Orders   1. Essential hypertension  CBC Auto Differential   2. Chronic bronchitis, unspecified chronic bronchitis type ([address]     3. Osteopenia, unspecified location     4. Vitamin D deficiency  Vitamin D 25 Hydroxy   5. Hyperlipidemia, unspecified hyperlipidemia type  Lipid Panel    Comprehensive Metabolic Panel   6. Allergic rhinitis, unspecified seasonality, unspecified trigger     7. Family history of malignant neoplasm of uterus     8. Hiatal hernia     9. Screening for diabetes mellitus     10. Preventative health care  Hemoglobin A1C     Plan:     -Will continue with current treatment as patient is stable on current treatment.   -Follow a low fat, low cholesterol diet.  -labs ordered to be completed before next apppointment  - repeat CT to be completed between March and September.   -pt due for annual wellness visit in January. Return in about 1 month (around 1/18/2021) for 1 month for annual wellness visit, 4 months for follow up on labs and chronic issues. Greater than 50% of this 30 minute visit was spent counseling and coordinating care. Medications, labs, diagnostic studies, consultations and follow-up as documented in this encounter.  Rest of systems unchanged, continue current treatments    Matthew Schmitz, APRN-CNP

## 2020-12-18 ENCOUNTER — OFFICE VISIT (OUTPATIENT)
Dept: FAMILY MEDICINE CLINIC | Age: 75
End: 2020-12-18
Payer: MEDICARE

## 2020-12-18 ENCOUNTER — OFFICE VISIT (OUTPATIENT)
Dept: GASTROENTEROLOGY | Age: 75
End: 2020-12-18
Payer: MEDICARE

## 2020-12-18 VITALS
HEART RATE: 86 BPM | SYSTOLIC BLOOD PRESSURE: 132 MMHG | TEMPERATURE: 97.9 F | DIASTOLIC BLOOD PRESSURE: 76 MMHG | BODY MASS INDEX: 29.86 KG/M2 | WEIGHT: 176 LBS | RESPIRATION RATE: 16 BRPM | OXYGEN SATURATION: 98 %

## 2020-12-18 VITALS — SYSTOLIC BLOOD PRESSURE: 123 MMHG | BODY MASS INDEX: 29.86 KG/M2 | WEIGHT: 176 LBS | DIASTOLIC BLOOD PRESSURE: 74 MMHG

## 2020-12-18 PROBLEM — Z80.0 FAMILY HISTORY OF LYNCH SYNDROME: Status: ACTIVE | Noted: 2020-12-18

## 2020-12-18 PROBLEM — N73.6 FEMALE PELVIC PERITONEAL ADHESIONS: Status: ACTIVE | Noted: 2020-12-18

## 2020-12-18 PROBLEM — K44.9 HIATAL HERNIA: Status: ACTIVE | Noted: 2020-12-18

## 2020-12-18 PROBLEM — N80.30 ENDOMETRIOSIS OF PELVIC PERITONEUM: Status: ACTIVE | Noted: 2020-12-18

## 2020-12-18 PROBLEM — R03.0 ELEVATED BLOOD PRESSURE READING: Status: RESOLVED | Noted: 2020-07-10 | Resolved: 2020-12-18

## 2020-12-18 PROCEDURE — 99213 OFFICE O/P EST LOW 20 MIN: CPT | Performed by: INTERNAL MEDICINE

## 2020-12-18 PROCEDURE — 99214 OFFICE O/P EST MOD 30 MIN: CPT | Performed by: NURSE PRACTITIONER

## 2020-12-18 RX ORDER — ACETAMINOPHEN AND CODEINE PHOSPHATE 300; 30 MG/1; MG/1
1 TABLET ORAL
COMMUNITY
Start: 2020-11-08 | End: 2020-12-18

## 2020-12-18 RX ORDER — BENZONATATE 100 MG/1
100 CAPSULE ORAL 3 TIMES DAILY PRN
COMMUNITY
Start: 2020-09-04 | End: 2020-12-18

## 2020-12-18 RX ORDER — IBUPROFEN 600 MG/1
1 TABLET ORAL 3 TIMES DAILY PRN
COMMUNITY
Start: 2020-11-08

## 2020-12-18 RX ORDER — COVID-19 ANTIGEN TEST
220 KIT MISCELLANEOUS 2 TIMES DAILY WITH MEALS
COMMUNITY
End: 2020-12-18

## 2020-12-18 RX ORDER — HYDROCHLOROTHIAZIDE 12.5 MG/1
12.5 CAPSULE, GELATIN COATED ORAL EVERY MORNING
Qty: 90 CAPSULE | Refills: 1 | Status: CANCELLED | OUTPATIENT
Start: 2020-12-18

## 2020-12-18 ASSESSMENT — ENCOUNTER SYMPTOMS
NAUSEA: 0
ABDOMINAL DISTENTION: 0
CHEST TIGHTNESS: 0
COUGH: 1
ABDOMINAL PAIN: 0
TROUBLE SWALLOWING: 0
SHORTNESS OF BREATH: 0
COUGH: 0
WHEEZING: 0
DIARRHEA: 0
DIARRHEA: 0
VOICE CHANGE: 0
RECTAL PAIN: 0
NAUSEA: 0
SORE THROAT: 0
COLOR CHANGE: 0
BACK PAIN: 0
ANAL BLEEDING: 0
ABDOMINAL PAIN: 0
RHINORRHEA: 0
VOMITING: 0
CONSTIPATION: 1
BLOOD IN STOOL: 0
CHOKING: 1
BACK PAIN: 0
SINUS PRESSURE: 0
EYES NEGATIVE: 1
ALLERGIC/IMMUNOLOGIC NEGATIVE: 1
ABDOMINAL DISTENTION: 0
SORE THROAT: 0
CONSTIPATION: 0
GASTROINTESTINAL NEGATIVE: 1

## 2020-12-18 NOTE — PROGRESS NOTES
Visit Information    Have you changed or started any medications since your last visit including any over-the-counter medicines, vitamins, or herbal medicines? no   Have you stopped taking any of your medications? Is so, why? -  no  Are you having any side effects from any of your medications? - no    Have you seen any other physician or provider since your last visit?  no   Have you had any other diagnostic tests since your last visit?  no   Have you been seen in the emergency room and/or had an admission in a hospital since we last saw you?  no   Have you had your routine dental cleaning in the past 6 months?  no     Do you have an active MyChart account? If no, what is the barrier?   Yes    Patient Care Team:  ARIEL Burciaga CNP [facility]  ARIEL Davies CNP as PCP - Family Medicine (Certified Nurse Practitioner)  Chalo Dia MD as Consulting Physician (Gastroenterology)    Medical History Review  Past Medical, Family, and Social History reviewed and does contribute to the patient presenting condition    Health Maintenance   Topic Date Due    Annual Wellness Visit (AWV)  01/08/2021    DEXA (modify frequency per FRAX score)  07/10/2021 (Originally 11/5/1999)    Potassium monitoring  09/08/2021    Creatinine monitoring  09/08/2021    DTaP/Tdap/Td vaccine (3 - Td) 11/18/2029    Flu vaccine  Completed    Shingles Vaccine  Completed    Pneumococcal 65+ years Vaccine  Completed    Hepatitis A vaccine  Aged Out    Hepatitis B vaccine  Aged Out    Hib vaccine  Aged Out    Meningococcal (ACWY) vaccine  Aged Out

## 2020-12-18 NOTE — PROGRESS NOTES
GI FOLLOW UP    INTERVAL HISTORY:       Clinically doing well from GI standpoint  Underwent recent total abdominal hysterectomyclinically doing well status post procedure    Chief Complaint   Patient presents with    Follow-up     Patient states doing well since last visit.  Hemorrhoids     Uses cream that was ordered and seems to be controlling any flare ups or bleeding. HISTORY OF PRESENT ILLNESS: Ms.Margaret MARITZA Colbert is a 75 y. o. female with a past history remarkable for arthritis, HTN (diet controlled) , referred for evaluation of fecal incontinence and blood in the stool. She has been on the Adapx diet and has been extremely compliant with her regimen, since onset of diet has noted significant bloating, abdominal distention and increased fecal urgency. Advised cessation of this particular diet.      Patient was observed significant clinical improvement with relationship to her bloating abdominal distention and fecal urgency while avoiding the gas producing foods from the list provided on the last visit. Celiac disease panel was negative  Comprehensive food allergy panel was negative  Basic lab work was unremarkable     Clinically the patient is doing very well from a GI standpoint and with her dietary modifications and restrictions. Tammy Vega is yet to see a nutritionist/dietitian to aid in her weight loss. Tammy Vega has gained a few pound since her last visit in March.  She was able to get in contact with genetic counseling and was given a packet for filling to complete her initial evaluation prior to testing for Hancock syndrome. Past Medical,Family, and Social History reviewed and does contribute to the patient presenting condition. Patient's PMH/PSH,SH,PSYCH Hx, MEDs, ALLERGIES, and ROS were all reviewed and updated in the appropriate sections.     PAST MEDICAL HISTORY: Past Medical History:   Diagnosis Date    Arthritis     Blood in stool 12/4/2019    CMV (cytomegalovirus infection) [address] [address]     Hypertension        Past Surgical History:   Procedure Laterality Date    CERVICAL FUSION      DILATION AND CURETTAGE OF UTERUS  09/05/2019    HYSTERECTOMY, TOTAL ABDOMINAL  11/09/2020    TOTAL HIP ARTHROPLASTY Right 2013       CURRENT MEDICATIONS:    Current Outpatient Medications:     ibuprofen (ADVIL;MOTRIN) 600 MG tablet, Take 1 tablet by mouth 3 times daily as needed, Disp: , Rfl:     Multiple Vitamins-Minerals (MULTIVITAMIN ADULT PO), Take 1 tablet by mouth daily, Disp: , Rfl:     calcium carbonate (OSCAL) 500 MG TABS tablet, Take 500 mg by mouth daily, Disp: , Rfl:     fluticasone (FLONASE) 50 MCG/ACT nasal spray, 2 sprays by Nasal route daily, Disp: 1 Bottle, Rfl: 0    Coenzyme Q10 (COQ10) 100 MG CAPS, Take 1 capsule by mouth daily , Disp: , Rfl:     loratadine (CLARITIN) 10 MG tablet, Take 10 mg by mouth daily as needed (Seasonal allergies) , Disp: , Rfl:     Biotin 1000 MCG TABS, Take 1,000 mcg by mouth daily , Disp: , Rfl:     ALLERGIES:   Allergies   Allergen Reactions    Hydrocodone-Acetaminophen Hives    Oxycodone     Sulfa Antibiotics Hives       FAMILY HISTORY:       Problem Relation Age of Onset    Stroke Mother     Heart Attack Father     Endometrial Cancer Sister 76    Colon Cancer Sister     Colon Cancer Sister         Endometrial CA possibly spread to colon    Endometrial Cancer Sister 70    Other Paternal Grandmother         RA         SOCIAL HISTORY:   Social History     Socioeconomic History    Marital status:      Spouse name: Not on file    Number of children: Not on file    Years of education: Not on file    Highest education level: Not on file   Occupational History    Not on file   Social Needs    Financial resource strain: Not on file    Food insecurity     Worry: Not on file     Inability: Not on file  Transportation needs     Medical: Not on file     Non-medical: Not on file   Tobacco Use    Smoking status: Never Smoker    Smokeless tobacco: Never Used   Substance and Sexual Activity    Alcohol use: Yes     Comment: 2 x monthly    Drug use: No    Sexual activity: Not Currently   Lifestyle    Physical activity     Days per week: Not on file     Minutes per session: Not on file    Stress: Not on file   Relationships    Social connections     Talks on phone: Not on file     Gets together: Not on file     Attends [employer] service: Not on file     Active member of club or organization: Not on file     Attends meetings of clubs or organizations: Not on file     Relationship status: Not on file    Intimate partner violence     Fear of current or ex partner: Not on file     Emotionally abused: Not on file     Physically abused: Not on file     Forced sexual activity: Not on file   Other Topics Concern    Not on file   Social History Narrative    Not on file       REVIEW OF SYSTEMS: A 12-point review of systems was obtained and pertinent positives and negatives were listed below. REVIEW OF SYSTEMS:     Constitutional: No fever, no chills, no lethargy, no weakness. HEENT:  No headache, otalgia, itchy eyes, nasal discharge or sore throat. Cardiac:  No chest pain, dyspnea, orthopnea or PND. Chest:   No cough, phlegm or wheezing. Abdomen:      Detailed by MA   Neuro:  No focal weakness, abnormal movements or seizure like activity. Skin:   No rashes, no itching. :   No hematuria, no pyuria, no dysuria, no flank pain. Extremities:  No swelling or joint pains. ROS was otherwise negative    Review of Systems   Constitutional: Negative. Negative for appetite change, fatigue and unexpected weight change. HENT: Negative. Negative for dental problem, postnasal drip, sinus pressure, sore throat, trouble swallowing and voice change. Eyes: Negative. Negative for visual disturbance. Respiratory: Positive for cough and choking (off and on). Negative for wheezing. Cardiovascular: Negative. Negative for chest pain, palpitations and leg swelling. Gastrointestinal: Negative. Negative for abdominal distention (Improved), abdominal pain, anal bleeding, blood in stool, constipation, diarrhea, nausea, rectal pain and vomiting. Endocrine: Negative. Genitourinary: Negative. Negative for difficulty urinating. Musculoskeletal: Positive for arthralgias and myalgias. Negative for back pain and gait problem. Skin: Negative. Allergic/Immunologic: Negative. Negative for environmental allergies and food allergies. Neurological: Positive for numbness. Negative for dizziness, weakness, light-headedness and headaches. Hematological: Negative. Does not bruise/bleed easily. Psychiatric/Behavioral: Positive for sleep disturbance. The patient is not nervous/anxious. All other systems reviewed and are negative. PHYSICAL EXAMINATION: Vital signs reviewed per the nursing documentation. /74   Wt 176 lb (79.8 kg)   BMI 29.86 kg/m²   Body mass index is 29.86 kg/m². Physical Exam    Physical Exam   Constitutional: Patient is oriented to person, place, and time. Patient appears well-developed and well-nourished. HENT:   Head: Normocephalic and atraumatic. Eyes: Pupils are equal, round, and reactive to light. EOM are normal.   Neck: Normal range of motion. Neck supple. No JVD present. No tracheal deviation present. No thyromegaly present. Cardiovascular: Normal rate, regular rhythm, normal heart sounds and intact distal pulses. Pulmonary/Chest: Effort normal and breath sounds normal. No stridor. No respiratory distress. He has no wheezes. He has no rales. He exhibits no tenderness. Abdominal: Soft. Bowel sounds are normal. He exhibits no distension and no mass. There is no tenderness. There is no rebound and no guarding. No hernia. Musculoskeletal: Normal range of motion. Lymphadenopathy:    Patient has no cervical adenopathy. Neurological: Patient is alert and oriented to person, place, and time. Psychiatric: Patient has a normal mood and affect. Patient behavior is normal.       LABORATORY DATA: Reviewed  Lab Results   Component Value Date    WBC 5.4 09/08/2020    HGB 13.8 09/08/2020    HCT 41.9 09/08/2020    MCV 87.3 09/08/2020     09/08/2020     (H) 09/08/2020    K 4.4 09/08/2020     (H) 09/08/2020    CO2 20 09/08/2020    BUN 18 09/08/2020    CREATININE 0.75 09/08/2020    LABALBU 4.3 09/08/2020    BILITOT 0.68 09/08/2020    ALKPHOS 89 09/08/2020    AST 19 09/08/2020    ALT 19 09/08/2020    INR 1.4 07/20/2015         Lab Results   Component Value Date    RBC 4.80 09/08/2020    HGB 13.8 09/08/2020    MCV 87.3 09/08/2020    MCH 28.8 09/08/2020    MCHC 32.9 09/08/2020    RDW 13.5 09/08/2020    MPV 10.2 09/08/2020    BASOPCT 1 09/08/2020    LYMPHSABS 2.16 09/08/2020    MONOSABS 0.60 09/08/2020    NEUTROABS 2.46 09/08/2020    EOSABS 0.08 09/08/2020    BASOSABS 0.04 09/08/2020         DIAGNOSTIC TESTING:     No results found. IMPRESSION: Ms.Margaret MARITZA Colbert is a 75 y. o. female with a past history remarkable for arthritis, HTN (diet controlled) , referred for evaluation of fecal incontinence and blood in the stool. She has been on the Crisp diet and has been extremely compliant with her regimen, since onset of diet has noted significant bloating, abdominal distention and increased fecal urgency. Advised cessation of this particular diet at the time. Patient clinically has been doing well from a GI standpoint so since adjusting her dietary lifestyle. She was previously placed on Prilosec for possible exertional dyspnea and a hiatal hernia. This was discontinued        PLAN:    1) possible symptomatic hiatal herniapreviously on Prilosec now discontinued. Clinically doing well from a GI standpoint. 2) return to clinic as needed        Thank you for allowing me to participate in the care of Ms. Colbert. For any further questions please do not hesitate to contact me. I have reviewed and agree with the ROS entered by the MA/LPN from today's encounter documented in a separate note. Christopher Navas MD, MPH   [facility] Gastroenterology  Office #: [phone]    this note is created with the assistance of a speech recognition program.  While intending to generate a document that actually reflects the content of the visit, the document can still have some errors including those of syntax and sound a like substitutions which may escape proof reading. It such instances, actual meaning can be extrapolated by contextual diversion.

## 2021-01-13 ASSESSMENT — LIFESTYLE VARIABLES
HAS A RELATIVE, FRIEND, DOCTOR, OR ANOTHER HEALTH PROFESSIONAL EXPRESSED CONCERN ABOUT YOUR DRINKING OR SUGGESTED YOU CUT DOWN: 0
HOW OFTEN DO YOU HAVE A DRINK CONTAINING ALCOHOL: MONTHLY OR LESS
HOW OFTEN DURING THE LAST YEAR HAVE YOU FAILED TO DO WHAT WAS NORMALLY EXPECTED FROM YOU BECAUSE OF DRINKING: 0
AUDIT-C TOTAL SCORE: 0
HOW OFTEN DURING THE LAST YEAR HAVE YOU NEEDED AN ALCOHOLIC DRINK FIRST THING IN THE MORNING TO GET YOURSELF GOING AFTER A NIGHT OF HEAVY DRINKING: 0
HOW OFTEN DURING THE LAST YEAR HAVE YOU HAD A FEELING OF GUILT OR REMORSE AFTER DRINKING: NEVER
AUDIT TOTAL SCORE: 0
HOW MANY STANDARD DRINKS CONTAINING ALCOHOL DO YOU HAVE ON A TYPICAL DAY: ONE OR TWO
HOW OFTEN DURING THE LAST YEAR HAVE YOU FAILED TO DO WHAT WAS NORMALLY EXPECTED FROM YOU BECAUSE OF DRINKING: NEVER
HOW OFTEN DURING THE LAST YEAR HAVE YOU FOUND THAT YOU WERE NOT ABLE TO STOP DRINKING ONCE YOU HAD STARTED: NEVER
HAS A RELATIVE, FRIEND, DOCTOR, OR ANOTHER HEALTH PROFESSIONAL EXPRESSED CONCERN ABOUT YOUR DRINKING OR SUGGESTED YOU CUT DOWN: NO
HAVE YOU OR SOMEONE ELSE BEEN INJURED AS A RESULT OF YOUR DRINKING: 0
HAVE YOU OR SOMEONE ELSE BEEN INJURED AS A RESULT OF YOUR DRINKING: NO
HOW OFTEN DURING THE LAST YEAR HAVE YOU HAD A FEELING OF GUILT OR REMORSE AFTER DRINKING: 0
HOW OFTEN DURING THE LAST YEAR HAVE YOU NEEDED AN ALCOHOLIC DRINK FIRST THING IN THE MORNING TO GET YOURSELF GOING AFTER A NIGHT OF HEAVY DRINKING: NEVER
HOW OFTEN DURING THE LAST YEAR HAVE YOU BEEN UNABLE TO REMEMBER WHAT HAPPENED THE NIGHT BEFORE BECAUSE YOU HAD BEEN DRINKING: NEVER
HOW OFTEN DO YOU HAVE A DRINK CONTAINING ALCOHOL: 1
AUDIT TOTAL SCORE: 1
AUDIT-C TOTAL SCORE: 1
HOW OFTEN DO YOU HAVE SIX OR MORE DRINKS ON ONE OCCASION: NEVER
HOW MANY STANDARD DRINKS CONTAINING ALCOHOL DO YOU HAVE ON A TYPICAL DAY: 0

## 2021-01-13 ASSESSMENT — PATIENT HEALTH QUESTIONNAIRE - PHQ9
SUM OF ALL RESPONSES TO PHQ QUESTIONS 1-9: 0

## 2021-01-20 ENCOUNTER — OFFICE VISIT (OUTPATIENT)
Dept: FAMILY MEDICINE CLINIC | Age: 76
End: 2021-01-20
Payer: MEDICARE

## 2021-01-20 VITALS
WEIGHT: 171 LBS | RESPIRATION RATE: 16 BRPM | SYSTOLIC BLOOD PRESSURE: 126 MMHG | TEMPERATURE: 96.9 F | HEIGHT: 64 IN | DIASTOLIC BLOOD PRESSURE: 74 MMHG | HEART RATE: 81 BPM | BODY MASS INDEX: 29.19 KG/M2 | OXYGEN SATURATION: 96 %

## 2021-01-20 DIAGNOSIS — Z00.00 PREVENTATIVE HEALTH CARE: ICD-10-CM

## 2021-01-20 DIAGNOSIS — Z00.00 ROUTINE GENERAL MEDICAL EXAMINATION AT A HEALTH CARE FACILITY: Primary | ICD-10-CM

## 2021-01-20 PROBLEM — J42 CHRONIC BRONCHITIS (HCC): Status: RESOLVED | Noted: 2020-09-04 | Resolved: 2021-01-20

## 2021-01-20 PROCEDURE — G0439 PPPS, SUBSEQ VISIT: HCPCS | Performed by: NURSE PRACTITIONER

## 2021-01-20 NOTE — PROGRESS NOTES
Medicare Annual Wellness Visit  Name: Hernán Bailey Date: 2021   MRN: [id number] Sex: Female   Age: 67 y.o. Ethnicity: Non-/Non    : 1945 Race: Paulett Kayser is here for Medicare AWV    Screenings for behavioral, psychosocial and functional/safety risks, and cognitive dysfunction are all negative except as indicated below. These results, as well as other patient data from the [address] form, are documented in Flowsheets linked to this Encounter. Allergies   Allergen Reactions    Hydrocodone-Acetaminophen Hives    Oxycodone     Sulfa Antibiotics Hives       Prior to Visit Medications    Medication Sig Taking?  Authorizing Provider   ibuprofen (ADVIL;MOTRIN) 600 MG tablet Take 1 tablet by mouth 3 times daily as needed Yes Historical Provider, MD   Multiple Vitamins-Minerals (MULTIVITAMIN ADULT PO) Take 1 tablet by mouth daily Yes Historical Provider, MD   calcium carbonate (OSCAL) 500 MG TABS tablet Take 500 mg by mouth daily Yes Historical Provider, MD   fluticasone (FLONASE) 50 MCG/ACT nasal spray 2 sprays by Nasal route daily Yes ARIEL Lane - CNP   Coenzyme Q10 (COQ10) 100 MG CAPS Take 1 capsule by mouth daily  Yes Historical Provider, MD   loratadine (CLARITIN) 10 MG tablet Take 10 mg by mouth daily as needed (Seasonal allergies)  Yes Historical Provider, MD   Biotin 1000 MCG TABS Take 1,000 mcg by mouth daily  Yes Historical Provider, MD       Past Medical History:   Diagnosis Date    Arthritis     Blood in stool 2019    CMV (cytomegalovirus infection) ([facility] [address]     Hypertension        Past Surgical History:   Procedure Laterality Date    CERVICAL FUSION      DILATION AND CURETTAGE OF UTERUS  2019    HYSTERECTOMY, TOTAL ABDOMINAL  2020    TOTAL HIP ARTHROPLASTY Right 2013       Family History   Problem Relation Age of Onset    Stroke Mother     Heart Attack Father     Endometrial Cancer Sister 76  Colon Cancer Sister     Colon Cancer Sister         Endometrial CA possibly spread to colon    Endometrial Cancer Sister 70    Other Paternal Grandmother         RA       CareTeam (Including outside providers/suppliers regularly involved in providing care):   Patient Care Team:  ARIEL Ye CNP as PCP - [facility] Empaneled Provider  ARIEL Li Junior, CNP as PCP - Family Medicine (Certified Nurse Practitioner)  Stephani Brown MD as Consulting Physician (Gastroenterology)    Wt Readings from Last 3 Encounters:   01/20/21 171 lb (77.6 kg)   12/18/20 176 lb (79.8 kg)   12/18/20 176 lb (79.8 kg)     Vitals:    01/20/21 0851   BP: 126/74   Pulse: 81   Resp: 16   Temp: 96.9 °F (36.1 °C)   SpO2: 96%   Weight: 171 lb (77.6 kg)   Height: 5' 4\" (1.626 m)     Body mass index is 29.35 kg/m². Based upon direct observation of the patient, evaluation of cognition reveals recent and remote memory intact.     General Appearance: alert and oriented to person, place and time, well developed and well- nourished, in no acute distress  Skin: warm and dry, no rash or erythema  Head: normocephalic and atraumatic  Eyes: pupils equal, round, and reactive to light, extraocular eye movements intact, conjunctivae normal  ENT: tympanic membrane, external ear and ear canal normal bilaterally, nose without deformity, nasal mucosa and turbinates normal without polyps  Neck: supple and non-tender without mass, no thyromegaly or thyroid nodules, no cervical lymphadenopathy  Pulmonary/Chest: clear to auscultation bilaterally- no wheezes, rales or rhonchi, normal air movement, no respiratory distress  Cardiovascular: normal rate, regular rhythm, normal S1 and S2, no murmurs, rubs, clicks, or gallops, distal pulses intact, no carotid bruits  Abdomen: soft, non-tender, non-distended, normal bowel sounds, no masses or organomegaly  Extremities: no cyanosis, clubbing or edema Musculoskeletal: normal range of motion, no joint swelling, deformity or tenderness  Neurologic: reflexes normal and symmetric, no cranial nerve deficit, gait, coordination and speech normal    Patient's complete Health Risk Assessment and screening values have been reviewed and are found in Flowsheets. The following problems were reviewed today and where indicated follow up appointments were made and/or referrals ordered. Positive Risk Factor Screenings with Interventions:          General Health and ACP:  General  In general, how would you say your health is?: Excellent  In the past 7 days, have you experienced any of the following?  New or Increased Pain, New or Increased Fatigue, Loneliness, Social Isolation, Stress or Anger?: (!) New or Increased Fatigue  Do you get the social and emotional support that you need?: Yes  Do you have a Living Will?: Yes  Advance Directives     Power of  Living Will ACP-Advance Directive ACP-Power of     Not on File Not on File Not on File Not on File      General Health Risk Interventions:  · Fatigue: regular exercise recommended- 3-5 times per week, 30-45 minutes per session, due to recent surgery of family member not getting as much sleep        Personalized Preventive Plan   Current Health Maintenance Status  Immunization History   Administered Date(s) Administered    Hepatitis A Adult (Havrix, Vaqta) 11/18/2019    Influenza Vaccine, unspecified formulation 10/14/2013, 12/20/2016    Influenza Virus Vaccine 10/14/2013    Influenza, High Dose (Fluzone 65 yrs and older) 12/02/2014, 11/16/2015, 10/02/2017, 11/06/2019, 09/08/2020    Influenza, High-dose, Jamal Bahena, 65 yrs +, IM (Fluzone) 09/08/2020    Influenza, Triv, inactivated, subunit, adjuvanted, IM (Fluad 65 yrs and older) 10/05/2018    Pneumococcal Conjugate 13-valent ([id number]) 10/02/2017, 10/05/2018    Pneumococcal Polysaccharide ([id number]) 10/12/2018    Pneumococcal Vaccine 10/02/2017  Tdap (Boostrix, Adacel) 10/12/2018, 11/18/2019    Zoster Recombinant (Shingrix) 04/19/2019, 11/06/2019        Health Maintenance   Topic Date Due    Hepatitis C screen  1945    Annual Wellness Visit (AWV)  10/01/2019    DEXA (modify frequency per FRAX score)  07/10/2021 (Originally 11/5/1999)    Potassium monitoring  09/08/2021    Creatinine monitoring  09/08/2021    DTaP/Tdap/Td vaccine (3 - Td) 11/18/2029    Flu vaccine  Completed    Shingles Vaccine  Completed    Pneumococcal 65+ years Vaccine  Completed    Hepatitis A vaccine  Aged Out    Hepatitis B vaccine  Aged Out    Hib vaccine  Aged Out    Meningococcal (ACWY) vaccine  Aged Out     Recommendations for Rad Due: see orders and patient instructions/AVS.  . Recommended screening schedule for the next 5-10 years is provided to the patient in written form: see Patient Sean Hdez was seen today for medicare awv. Diagnoses and all orders for this visit:    Routine general medical examination at a health care facility    Dexa scan done at [facility] with OB/GYN, she states she gets every 2 years and is due in June.

## 2021-01-20 NOTE — PROGRESS NOTES
Visit Information    Have you changed or started any medications since your last visit including any over-the-counter medicines, vitamins, or herbal medicines? no   Have you stopped taking any of your medications? Is so, why? -  no  Are you having any side effects from any of your medications? - no    Have you seen any other physician or provider since your last visit?  no   Have you had any other diagnostic tests since your last visit?  no   Have you been seen in the emergency room and/or had an admission in a hospital since we last saw you?  no   Have you had your routine dental cleaning in the past 6 months?  no     Do you have an active MyChart account? If no, what is the barrier?   Yes    Patient Care Team:  ARIEL Stone CNP as PCP - [facility]  ARIEL Faust CNP as PCP - Family Medicine (Certified Nurse Practitioner)  Alisha East MD as Consulting Physician (Gastroenterology)    Medical History Review  Past Medical, Family, and Social History reviewed and does contribute to the patient presenting condition    Health Maintenance   Topic Date Due    Hepatitis C screen  1945    Annual Wellness Visit (AWV)  10/01/2019    DEXA (modify frequency per FRAX score)  07/10/2021 (Originally 11/5/1999)    Potassium monitoring  09/08/2021    Creatinine monitoring  09/08/2021    DTaP/Tdap/Td vaccine (3 - Td) 11/18/2029    Flu vaccine  Completed    Shingles Vaccine  Completed    Pneumococcal 65+ years Vaccine  Completed    Hepatitis A vaccine  Aged Out    Hepatitis B vaccine  Aged Out    Hib vaccine  Aged Out    Meningococcal (ACWY) vaccine  Aged Out

## 2021-01-20 NOTE — PATIENT INSTRUCTIONS
Personalized Preventive Plan for Shane Wagner - 1/20/2021  Medicare offers a range of preventive health benefits. Some of the tests and screenings are paid in full while other may be subject to a deductible, co-insurance, and/or copay. Some of these benefits include a comprehensive review of your medical history including lifestyle, illnesses that may run in your family, and various assessments and screenings as appropriate. After reviewing your medical record and screening and assessments performed today your provider may have ordered immunizations, labs, imaging, and/or referrals for you. A list of these orders (if applicable) as well as your Preventive Care list are included within your After Visit Summary for your review. Other Preventive Recommendations:    · A preventive eye exam performed by an eye specialist is recommended every 1-2 years to screen for glaucoma; cataracts, macular degeneration, and other eye disorders. · A preventive dental visit is recommended every 6 months. · Try to get at least 150 minutes of exercise per week or 10,000 steps per day on a pedometer . · Order or download the FREE \"Exercise & Physical Activity: Your Everyday Guide\" from [employer]. Call 9-517.844.8589 or search [employer] online. · You need 4633-0105 mg of calcium and 6815-1819 IU of vitamin D per day. It is possible to meet your calcium requirement with diet alone, but a vitamin D supplement is usually necessary to meet this goal.  · When exposed to the sun, use a sunscreen that protects against both UVA and UVB radiation with an SPF of 30 or greater. Reapply every 2 to 3 hours or after sweating, drying off with a towel, or swimming. · Always wear a seat belt when traveling in a car. Always wear a helmet when riding a bicycle or motorcycle.

## 2021-02-02 ENCOUNTER — IMMUNIZATION (OUTPATIENT)
Dept: LAB | Age: 76
End: 2021-02-02
Payer: MEDICARE

## 2021-02-02 PROCEDURE — 91301 COVID-19, MODERNA VACCINE 100MCG/0.5ML DOSE: CPT

## 2021-03-08 ENCOUNTER — IMMUNIZATION (OUTPATIENT)
Dept: LAB | Age: 76
End: 2021-03-08
Payer: MEDICARE

## 2021-03-08 PROCEDURE — 91301 COVID-19, MODERNA VACCINE 100MCG/0.5ML DOSE: CPT

## 2021-03-30 ENCOUNTER — HOSPITAL ENCOUNTER (OUTPATIENT)
Age: 76
Discharge: HOME | End: 2021-03-30
Payer: MEDICARE

## 2021-03-30 DIAGNOSIS — Z00.00 PREVENTATIVE HEALTH CARE: ICD-10-CM

## 2021-03-30 LAB — HEPATITIS C ANTIBODY: NONREACTIVE

## 2021-03-30 PROCEDURE — 36415 COLL VENOUS BLD VENIPUNCTURE: CPT

## 2021-03-30 PROCEDURE — 86803 HEPATITIS C AB TEST: CPT

## 2021-04-21 ENCOUNTER — OFFICE VISIT (OUTPATIENT)
Dept: FAMILY MEDICINE CLINIC | Age: 76
End: 2021-04-21
Payer: MEDICARE

## 2021-04-21 ENCOUNTER — HOSPITAL ENCOUNTER (OUTPATIENT)
Age: 76
Discharge: HOME | End: 2021-04-21
Payer: MEDICARE

## 2021-04-21 VITALS
DIASTOLIC BLOOD PRESSURE: 76 MMHG | RESPIRATION RATE: 16 BRPM | BODY MASS INDEX: 30.04 KG/M2 | HEART RATE: 71 BPM | WEIGHT: 175 LBS | SYSTOLIC BLOOD PRESSURE: 128 MMHG | TEMPERATURE: 97.3 F | OXYGEN SATURATION: 98 %

## 2021-04-21 DIAGNOSIS — I10 ESSENTIAL HYPERTENSION: ICD-10-CM

## 2021-04-21 DIAGNOSIS — E78.5 HYPERLIPIDEMIA, UNSPECIFIED HYPERLIPIDEMIA TYPE: ICD-10-CM

## 2021-04-21 DIAGNOSIS — M25.552 LEFT HIP PAIN: ICD-10-CM

## 2021-04-21 DIAGNOSIS — R42 LIGHTHEADEDNESS: ICD-10-CM

## 2021-04-21 DIAGNOSIS — Z01.818 PRE-OP TESTING: Primary | ICD-10-CM

## 2021-04-21 DIAGNOSIS — E55.9 VITAMIN D DEFICIENCY: ICD-10-CM

## 2021-04-21 DIAGNOSIS — Z00.00 PREVENTATIVE HEALTH CARE: ICD-10-CM

## 2021-04-21 PROBLEM — Z96.649 STATUS POST HIP REPLACEMENT: Status: ACTIVE | Noted: 2021-04-21

## 2021-04-21 PROBLEM — R53.83 FATIGUE: Status: RESOLVED | Noted: 2020-09-09 | Resolved: 2021-04-21

## 2021-04-21 PROBLEM — N88.2 STENOSIS OF CERVIX: Status: RESOLVED | Noted: 2020-09-09 | Resolved: 2021-04-21

## 2021-04-21 PROBLEM — N84.0 ENDOMETRIAL POLYP: Status: RESOLVED | Noted: 2020-09-09 | Resolved: 2021-04-21

## 2021-04-21 PROBLEM — M79.641 BILATERAL HAND PAIN: Status: RESOLVED | Noted: 2020-07-10 | Resolved: 2021-04-21

## 2021-04-21 PROBLEM — N80.30 ENDOMETRIOSIS OF PELVIC PERITONEUM: Status: RESOLVED | Noted: 2020-12-18 | Resolved: 2021-04-21

## 2021-04-21 PROBLEM — M79.642 BILATERAL HAND PAIN: Status: RESOLVED | Noted: 2020-07-10 | Resolved: 2021-04-21

## 2021-04-21 PROBLEM — R06.00 DYSPNEA: Status: RESOLVED | Noted: 2020-09-07 | Resolved: 2021-04-21

## 2021-04-21 PROBLEM — L50.9 URTICARIA: Status: RESOLVED | Noted: 2020-09-09 | Resolved: 2021-04-21

## 2021-04-21 PROBLEM — E78.89 ELEVATED HDL: Status: RESOLVED | Noted: 2020-09-09 | Resolved: 2021-04-21

## 2021-04-21 PROBLEM — M62.830 SPASM OF BACK MUSCLES: Status: RESOLVED | Noted: 2020-09-09 | Resolved: 2021-04-21

## 2021-04-21 PROBLEM — R15.9 INCONTINENCE OF FECES: Status: RESOLVED | Noted: 2019-12-04 | Resolved: 2021-04-21

## 2021-04-21 LAB
ABSOLUTE EOS #: 0.09 K/UL (ref 0–0.44)
ABSOLUTE IMMATURE GRANULOCYTE: <0.03 K/UL (ref 0–0.3)
ABSOLUTE LYMPH #: 1.67 K/UL (ref 1.1–3.7)
ABSOLUTE MONO #: 0.48 K/UL (ref 0.1–1.2)
ALBUMIN SERPL-MCNC: 4.1 G/DL (ref 3.5–5.2)
ALBUMIN/GLOBULIN RATIO: 1.5 (ref 1–2.5)
ALP BLD-CCNC: 82 U/L (ref 35–104)
ALT SERPL-CCNC: 17 U/L (ref 5–33)
ANION GAP SERPL CALCULATED.3IONS-SCNC: 7 MMOL/L (ref 9–17)
AST SERPL-CCNC: 19 U/L
BASOPHILS # BLD: 1 % (ref 0–2)
BASOPHILS ABSOLUTE: 0.03 K/UL (ref 0–0.2)
BILIRUB SERPL-MCNC: 0.54 MG/DL (ref 0.3–1.2)
BUN BLDV-MCNC: 22 MG/DL (ref 8–23)
BUN/CREAT BLD: ABNORMAL (ref 9–20)
CALCIUM SERPL-MCNC: 9.3 MG/DL (ref 8.6–10.4)
CHLORIDE BLD-SCNC: 107 MMOL/L (ref 98–107)
CHOLESTEROL/HDL RATIO: 2.9
CHOLESTEROL: 204 MG/DL
CO2: 26 MMOL/L (ref 20–31)
CREAT SERPL-MCNC: 0.71 MG/DL (ref 0.5–0.9)
DIFFERENTIAL TYPE: ABNORMAL
EOSINOPHILS RELATIVE PERCENT: 2 % (ref 1–4)
GFR AFRICAN AMERICAN: >60 ML/MIN
GFR NON-AFRICAN AMERICAN: >60 ML/MIN
GFR SERPL CREATININE-BSD FRML MDRD: ABNORMAL ML/MIN/{1.73_M2}
GFR SERPL CREATININE-BSD FRML MDRD: ABNORMAL ML/MIN/{1.73_M2}
GLUCOSE BLD-MCNC: 94 MG/DL (ref 70–99)
HCT VFR BLD CALC: 40.8 % (ref 36.3–47.1)
HDLC SERPL-MCNC: 71 MG/DL
HEMOGLOBIN: 13.1 G/DL (ref 11.9–15.1)
IMMATURE GRANULOCYTES: 0 %
LDL CHOLESTEROL: 114 MG/DL (ref 0–130)
LYMPHOCYTES # BLD: 33 % (ref 24–43)
MCH RBC QN AUTO: 28.6 PG (ref 25.2–33.5)
MCHC RBC AUTO-ENTMCNC: 32.1 G/DL (ref 28.4–34.8)
MCV RBC AUTO: 89.1 FL (ref 82.6–102.9)
MONOCYTES # BLD: 9 % (ref 3–12)
NRBC AUTOMATED: 0 PER 100 WBC
PDW BLD-RTO: 14.8 % (ref 11.8–14.4)
PLATELET # BLD: 232 K/UL (ref 138–453)
PLATELET ESTIMATE: ABNORMAL
PMV BLD AUTO: 10.5 FL (ref 8.1–13.5)
POTASSIUM SERPL-SCNC: 4.5 MMOL/L (ref 3.7–5.3)
RBC # BLD: 4.58 M/UL (ref 3.95–5.11)
RBC # BLD: ABNORMAL 10*6/UL
SEG NEUTROPHILS: 55 % (ref 36–65)
SEGMENTED NEUTROPHILS ABSOLUTE COUNT: 2.84 K/UL (ref 1.5–8.1)
SODIUM BLD-SCNC: 140 MMOL/L (ref 135–144)
TOTAL PROTEIN: 6.9 G/DL (ref 6.4–8.3)
TRIGL SERPL-MCNC: 93 MG/DL
VITAMIN D 25-HYDROXY: 32.4 NG/ML (ref 30–100)
VLDLC SERPL CALC-MCNC: ABNORMAL MG/DL (ref 1–30)
WBC # BLD: 5.1 K/UL (ref 3.5–11.3)
WBC # BLD: ABNORMAL 10*3/UL

## 2021-04-21 PROCEDURE — 36415 COLL VENOUS BLD VENIPUNCTURE: CPT

## 2021-04-21 PROCEDURE — 93000 ELECTROCARDIOGRAM COMPLETE: CPT | Performed by: NURSE PRACTITIONER

## 2021-04-21 PROCEDURE — 83036 HEMOGLOBIN GLYCOSYLATED A1C: CPT

## 2021-04-21 PROCEDURE — 82306 VITAMIN D 25 HYDROXY: CPT

## 2021-04-21 PROCEDURE — 99214 OFFICE O/P EST MOD 30 MIN: CPT | Performed by: NURSE PRACTITIONER

## 2021-04-21 PROCEDURE — 80053 COMPREHEN METABOLIC PANEL: CPT

## 2021-04-21 PROCEDURE — 85025 COMPLETE CBC W/AUTO DIFF WBC: CPT

## 2021-04-21 PROCEDURE — 80061 LIPID PANEL: CPT

## 2021-04-21 ASSESSMENT — ENCOUNTER SYMPTOMS
COUGH: 0
CHEST TIGHTNESS: 0
ABDOMINAL DISTENTION: 0
NAUSEA: 0
ABDOMINAL PAIN: 0
SHORTNESS OF BREATH: 0
RHINORRHEA: 0
COLOR CHANGE: 0
DIARRHEA: 0
SORE THROAT: 0
CONSTIPATION: 0
BACK PAIN: 0

## 2021-04-21 NOTE — PROGRESS NOTES
breath. Cardiovascular: Negative for chest pain and palpitations. Gastrointestinal: Negative for abdominal distention, abdominal pain, constipation, diarrhea and nausea. Endocrine: Negative for polydipsia, polyphagia and polyuria. Genitourinary: Negative for difficulty urinating and dysuria. Musculoskeletal: Negative for arthralgias, back pain and myalgias. Skin: Negative for color change and rash. Neurological: Positive for dizziness (sometimes when standing for long periods of time or if they are walking and then stop she will get hot ). Negative for weakness, light-headedness and headaches. Hematological: Negative for adenopathy. Psychiatric/Behavioral: Negative for agitation and behavioral problems. The patient is not nervous/anxious. Vitals:    04/21/21 0910   BP: 128/76   Pulse: 71   Resp: 16   Temp: 97.3 °F (36.3 °C)   SpO2: 98%     Objective:     Physical Exam  Vitals signs reviewed. Constitutional:       General: She is not in acute distress. Appearance: Normal appearance. HENT:      Head: Normocephalic and atraumatic. Right Ear: External ear normal.      Left Ear: External ear normal.      Nose: Nose normal.      Mouth/Throat:      Mouth: Mucous membranes are moist.      Pharynx: No oropharyngeal exudate or posterior oropharyngeal erythema. Eyes:      Extraocular Movements: Extraocular movements intact. Conjunctiva/sclera: Conjunctivae normal.      Pupils: Pupils are equal, round, and reactive to light. Neck:      Musculoskeletal: Normal range of motion. Cardiovascular:      Rate and Rhythm: Normal rate and regular rhythm. Pulses: Normal pulses. Heart sounds: Normal heart sounds. No murmur. Pulmonary:      Effort: Pulmonary effort is normal. No respiratory distress. Breath sounds: Normal breath sounds. No wheezing or rales. Abdominal:      General: Bowel sounds are normal. There is no distension. Palpations: Abdomen is soft. Tenderness: There is no abdominal tenderness. Musculoskeletal: Normal range of motion. Right lower leg: No edema. Left lower leg: No edema. Lymphadenopathy:      Cervical: No cervical adenopathy. Skin:     General: Skin is warm and dry. Neurological:      General: No focal deficit present. Mental Status: She is alert and oriented to person, place, and time. Deep Tendon Reflexes: Reflexes normal.   Psychiatric:         Mood and Affect: Mood normal.         Behavior: Behavior normal. Behavior is cooperative. Assessment:      Diagnosis Orders   1. Pre-op testing  EKG 12 Lead    EKG 12 Lead   2. Essential hypertension     3. Left hip pain     4. Lightheadedness     5. Hyperlipidemia, unspecified hyperlipidemia type       Plan:     -Will continue with current treatment as patient is stable on current treatment.   -Follow a low fat, low cholesterol diet. - would like her to try compression stockings. - when it is hot out and walking would like her to have 1 Gatorade or Powerade daily. - labs were ordered and pt states she will get completed. - will need pre-op clearance for hip surgery, EKG ordered and completed in office.   - Letter will be sent for pre-op clearance once all labs have returned. - reminded patient to get follow up of CT of chest done between now and September as ordered. - pt verbalized understanding plan of care. Medications, labs, diagnostic studies, consultations and follow-up as documented in this encounter. Rest of systems unchanged, continue current treatments    On this date 4/21/2021 I have spent 30 minutes reviewing previous notes, test results and face to face with the patient discussing the diagnosis and importance of compliance with the treatment plan as well as documenting on the day of the visit.     ARIEL Patrick-CNP

## 2021-04-22 LAB
ESTIMATED AVERAGE GLUCOSE: 111 MG/DL
HBA1C MFR BLD: 5.5 % (ref 4–6)

## 2021-08-06 ENCOUNTER — OFFICE VISIT (OUTPATIENT)
Dept: FAMILY MEDICINE CLINIC | Age: 76
End: 2021-08-06
Payer: MEDICARE

## 2021-08-06 ENCOUNTER — HOSPITAL ENCOUNTER (OUTPATIENT)
Age: 76
Setting detail: SPECIMEN
Discharge: HOME | End: 2021-08-06
Payer: MEDICARE

## 2021-08-06 VITALS
TEMPERATURE: 97.6 F | BODY MASS INDEX: 30.21 KG/M2 | SYSTOLIC BLOOD PRESSURE: 130 MMHG | HEART RATE: 69 BPM | WEIGHT: 176 LBS | OXYGEN SATURATION: 96 % | DIASTOLIC BLOOD PRESSURE: 76 MMHG | RESPIRATION RATE: 16 BRPM

## 2021-08-06 DIAGNOSIS — Z01.818 PREOP TESTING: ICD-10-CM

## 2021-08-06 DIAGNOSIS — Z01.818 PREOP TESTING: Primary | ICD-10-CM

## 2021-08-06 LAB
ANION GAP SERPL CALCULATED.3IONS-SCNC: 8 MMOL/L (ref 9–17)
BILIRUBIN, POC: NEGATIVE
BLOOD URINE, POC: NEGATIVE
BUN BLDV-MCNC: 17 MG/DL (ref 8–23)
BUN/CREAT BLD: ABNORMAL (ref 9–20)
CALCIUM SERPL-MCNC: 9.4 MG/DL (ref 8.6–10.4)
CHLORIDE BLD-SCNC: 105 MMOL/L (ref 98–107)
CLARITY, POC: NORMAL
CO2: 26 MMOL/L (ref 20–31)
COLOR, POC: NORMAL
CREAT SERPL-MCNC: 0.85 MG/DL (ref 0.5–0.9)
GFR AFRICAN AMERICAN: >60 ML/MIN
GFR NON-AFRICAN AMERICAN: >60 ML/MIN
GFR SERPL CREATININE-BSD FRML MDRD: ABNORMAL ML/MIN/{1.73_M2}
GFR SERPL CREATININE-BSD FRML MDRD: ABNORMAL ML/MIN/{1.73_M2}
GLUCOSE BLD-MCNC: 83 MG/DL (ref 70–99)
GLUCOSE URINE, POC: NEGATIVE
HCT VFR BLD CALC: 43.4 % (ref 36.3–47.1)
HEMOGLOBIN: 13.7 G/DL (ref 11.9–15.1)
KETONES, POC: NEGATIVE
LEUKOCYTE EST, POC: NORMAL
MCH RBC QN AUTO: 28.3 PG (ref 25.2–33.5)
MCHC RBC AUTO-ENTMCNC: 31.6 G/DL (ref 28.4–34.8)
MCV RBC AUTO: 89.7 FL (ref 82.6–102.9)
NITRITE, POC: NEGATIVE
NRBC AUTOMATED: 0 PER 100 WBC
PDW BLD-RTO: 13.7 % (ref 11.8–14.4)
PH, POC: 7
PLATELET # BLD: 244 K/UL (ref 138–453)
PMV BLD AUTO: 10.1 FL (ref 8.1–13.5)
POTASSIUM SERPL-SCNC: 4.6 MMOL/L (ref 3.7–5.3)
PROTEIN, POC: NEGATIVE
RBC # BLD: 4.84 M/UL (ref 3.95–5.11)
SODIUM BLD-SCNC: 139 MMOL/L (ref 135–144)
SPECIFIC GRAVITY, POC: 1.02
UROBILINOGEN, POC: 0.2
WBC # BLD: 4.1 K/UL (ref 3.5–11.3)

## 2021-08-06 PROCEDURE — 99213 OFFICE O/P EST LOW 20 MIN: CPT | Performed by: NURSE PRACTITIONER

## 2021-08-06 PROCEDURE — 81003 URINALYSIS AUTO W/O SCOPE: CPT | Performed by: NURSE PRACTITIONER

## 2021-08-06 SDOH — ECONOMIC STABILITY: FOOD INSECURITY: WITHIN THE PAST 12 MONTHS, YOU WORRIED THAT YOUR FOOD WOULD RUN OUT BEFORE YOU GOT MONEY TO BUY MORE.: NEVER TRUE

## 2021-08-06 SDOH — ECONOMIC STABILITY: FOOD INSECURITY: WITHIN THE PAST 12 MONTHS, THE FOOD YOU BOUGHT JUST DIDN'T LAST AND YOU DIDN'T HAVE MONEY TO GET MORE.: NEVER TRUE

## 2021-08-06 ASSESSMENT — ENCOUNTER SYMPTOMS
SHORTNESS OF BREATH: 0
COUGH: 0
DIARRHEA: 0
CONSTIPATION: 0
RHINORRHEA: 0
NAUSEA: 0
SORE THROAT: 0
ABDOMINAL PAIN: 0
CHEST TIGHTNESS: 0
ABDOMINAL DISTENTION: 0
BACK PAIN: 0
COLOR CHANGE: 0

## 2021-08-06 ASSESSMENT — SOCIAL DETERMINANTS OF HEALTH (SDOH): HOW HARD IS IT FOR YOU TO PAY FOR THE VERY BASICS LIKE FOOD, HOUSING, MEDICAL CARE, AND HEATING?: NOT HARD AT ALL

## 2021-08-06 NOTE — PROGRESS NOTES
Oz Moise, APRN-CNP  [address]  [address] [address]  [address] [address]  Dept: [phone]  Dept Fax: [phone]    HPI:   Rocio Read is a 67 y.o. female is an established patient who presents to the office today for a preoperative consultation at the request of surgeon Dr. James Good who plans on performing Left hip replacement on August 16. Planned anesthesia is General.  The patient has the following known anesthesia issues: no concerns, pt has never had any complications to anesthesia  Patient has a bleeding risk of : no recent abnormal bleeding .  - pt was seen in April for clearance but then surgery was pushed back to August due to insurance wanting her to do more PT before it would cover the procedure. My previous office notes, labs and diagnostic studies were reviewed prior to and during encounter. The patient's past medical, surgical, social, and family history as well as current medications and allergies were reviewed as documented in today's encounter by IVANA Alcantara. Current Outpatient Medications on File Prior to Visit   Medication Sig Dispense Refill    ibuprofen (ADVIL;MOTRIN) 600 MG tablet Take 1 tablet by mouth 3 times daily as needed      Multiple Vitamins-Minerals (MULTIVITAMIN ADULT PO) Take 1 tablet by mouth daily      calcium carbonate (OSCAL) 500 MG TABS tablet Take 500 mg by mouth daily      fluticasone (FLONASE) 50 MCG/ACT nasal spray 2 sprays by Nasal route daily 1 Bottle 0    Coenzyme Q10 (COQ10) 100 MG CAPS Take 1 capsule by mouth daily       loratadine (CLARITIN) 10 MG tablet Take 10 mg by mouth daily as needed (Seasonal allergies)       Biotin 1000 MCG TABS Take 1,000 mcg by mouth daily        No current facility-administered medications on file prior to visit. SUBJECTIVE:  Review of Systems   Constitutional: Negative for activity change, fatigue and fever.    HENT: Negative for congestion, ear pain, rhinorrhea and sore throat. Respiratory: Negative for cough, chest tightness and shortness of breath. Cardiovascular: Negative for chest pain and palpitations. Gastrointestinal: Negative for abdominal distention, abdominal pain, constipation, diarrhea and nausea. Endocrine: Negative for polydipsia, polyphagia and polyuria. Genitourinary: Negative for difficulty urinating and dysuria. Musculoskeletal: Negative for arthralgias, back pain and myalgias. Skin: Negative for color change and rash. Neurological: Negative for dizziness, weakness, light-headedness and headaches. Hematological: Negative for adenopathy. Psychiatric/Behavioral: Negative for agitation and behavioral problems. The patient is not nervous/anxious. OBJECTIVE:  /76   Pulse 69   Temp 97.6 °F (36.4 °C)   Resp 16   Wt 176 lb (79.8 kg)   SpO2 96%   BMI 30.21 kg/m²    Physical Exam  Vitals reviewed. Constitutional:       General: She is not in acute distress. Appearance: Normal appearance. HENT:      Head: Normocephalic and atraumatic. Right Ear: External ear normal.      Left Ear: External ear normal.      Nose: Nose normal.      Mouth/Throat:      Mouth: Mucous membranes are moist.      Pharynx: No oropharyngeal exudate or posterior oropharyngeal erythema. Eyes:      Extraocular Movements: Extraocular movements intact. Conjunctiva/sclera: Conjunctivae normal.      Pupils: Pupils are equal, round, and reactive to light. Cardiovascular:      Rate and Rhythm: Normal rate and regular rhythm. Pulses: Normal pulses. Heart sounds: Normal heart sounds. No murmur heard. Pulmonary:      Effort: Pulmonary effort is normal. No respiratory distress. Breath sounds: Normal breath sounds. No wheezing or rales. Abdominal:      General: Bowel sounds are normal. There is no distension. Palpations: Abdomen is soft. Tenderness: There is no abdominal tenderness. Musculoskeletal:         General: Normal range of motion. Cervical back: Normal range of motion. Right lower leg: No edema. Left lower leg: No edema. Lymphadenopathy:      Cervical: No cervical adenopathy. Skin:     General: Skin is warm and dry. Neurological:      General: No focal deficit present. Mental Status: She is alert and oriented to person, place, and time. Deep Tendon Reflexes: Reflexes normal.   Psychiatric:         Mood and Affect: Mood normal.         Behavior: Behavior normal. Behavior is cooperative. ASSESSMENT:   Diagnosis Orders   1. Preop testing  CBC    Basic Metabolic Panel    POCT Urinalysis No Micro (Auto)     PLAN:  - will continue with current treatment as patient is stable on current treatment. - Cardiographics: ECG: normal sinus rhythm, no blocks or conduction defects, no ischemic changes done within 6 months  - will await lab results for clearance. - Patient is scheduled for left hip replacement with Dr. Milton Sierra on 8/16/2021  - once labs have been reviewed patient will be cleared medically for the proposed surgery  - A clearance letter will be provided at that time.     - Rest of systems unchanged, continue current treatments. - On this date August 6, 2021,  I have spent greater than 50% of this visit reviewing previous notes, test results and/or face to face with the patient discussing the diagnoses, importance of compliance with the treatment plan, counseling, coordinating care as well as documenting on the day of the visit.      ARIEL López-CNP

## 2021-09-13 ENCOUNTER — HOSPITAL ENCOUNTER (OUTPATIENT)
Dept: PHYSICAL THERAPY | Facility: CLINIC | Age: 76
Setting detail: THERAPIES SERIES
Discharge: HOME | End: 2021-09-13
Payer: MEDICARE

## 2021-09-13 PROCEDURE — 97110 THERAPEUTIC EXERCISES: CPT

## 2021-09-13 PROCEDURE — 97161 PT EVAL LOW COMPLEX 20 MIN: CPT

## 2021-09-13 PROCEDURE — 97016 VASOPNEUMATIC DEVICE THERAPY: CPT

## 2021-09-13 NOTE — CONSULTS
[] [facility] CENTER &  Therapy  [address]  [phone]  F: [phone] [] [address]  [employer] [address]   [address]  P: [phone]  F: [phone] [x] [place]  [address]  P: [phone]  F: [phone] [] [address]  P: [phone]  F: [phone] [] [address]  [facility]   [address]   [place]: [phone]  F: [phone]      Physical Therapy Lower Extremity Evaluation    Date:  2021  Patient: Mac Joseph  :   MRN: [id number]  Physician: Dr Lamar Witt: medicare  Medical Diagnosis: L NAI  Rehab Codes: Y88.789, M25.552, R26.89  Onset date: 21  Next 's appt.: 10/? Subjective:   CC: post op L TKA  HPI: Pre-op \"bone on bone\" surgery was on . No HH PT.  HEP included glut sets, ab sets, LAQ, seated IR/ER. Using a RW. Pain is 3/10 now, worst is at night. Taking Tylenol 2x/day. She has Tramadol but tries not to take it.       PMHx: [] Unremarkable [] Diabetes [] HTN  [] Pacemaker   [] MI/Heart Problems [] Cancer [] Arthritis [x] Other: R hip replacement 5 years ago C3-4-5 fusion              [] Refer to full medical chart  In EPIC       Comorbidities:   [] Obesity [] Dialysis  [] N/A   [] Asthma/COPD [] Dementia [] Other:   [] Stroke [] Sleep apnea [] Other:   [] Vascular disease [] Rheumatic disease [] Other:     Tests: [] X-Ray: [] MRI:  [] Other:    Medications: [] Refer to full medical record [] None [] Other:  Allergies:      [] Refer to full medical record [] None [] Other:    Function:  Hand Dominance  [] Right  [] Left  Patient lives with:    In what type of home []  One story   [x] Two story   [] Split level   Number of stairs to enter 2 With handrail on the []  Right to enter   [] Left to enter   Bathroom has a []  Tub only  [] Tub/shower combo   [x] Walk in shower    [x]  Elevated toilet seat   Washing machine is on []  Main level   [] Second level   [] Basement   Employer    Job Status []  Normal duty   [] Light duty   [] Off due to condition    []  Retired   [] Not employed   [] Disability  [] Other:  []  Return to work:    Work activities/duties        ADL/IADL Previous level of function Current level of function Who currently assists the patient with task   Bathing  [] Independent  [] Assist [x] Independent  [] Assist    Dress/grooming [] Independent  [] Assist [] Independent  [x] Assist Shoes and socks   Transfer/mobility [] Independent  [] Assist [x] Independent  [] Assist    Feeding [] Independent  [] Assist [x] Independent  [] Assist    Toileting [] Independent  [] Assist [x] Independent  [] Assist    Driving [] Independent  [] Assist [x] Independent  [] Assist    Housekeeping [] Independent  [] Assist [] Independent  [x] Assist    Grocery shop/meal prep [] Independent  [] Assist [] Independent  [x] Assist      Gait Prior level of function Current level of function    [] Independent  [] Assist [x] Independent  [] Assist   Device: [] Independent [] Independent    [] Straight Cane [] Quad cane [] Straight Cane [] Quad cane    [] Standard walker [] Rolling walker   [] 4 wheeled walker [] Standard walker [x] Rolling walker   [] 4 wheeled walker    [] Wheelchair [] Wheelchair     Pain:  [x] Yes  [] No Location: L hip  Pain Rating: (0-10 scale) 3/10  Pain altered Tx:  [] Yes  [x] No  Action:    Symptoms:  [x] Improving [] Worsening [] Same  Better:  [] AM    [] PM    [] Sit    [] Rise/Sit    []Stand    [] Walk    [] Lying    [] Other:  Worse: [] AM    [] PM    [] Sit    [] Rise/Sit    []Stand    [] Walk    [] Lying    [] Bend                      [] Valsalva    [] Other:  Sleep: [] OK    [x] Disturbed    Objective:    ROM  ° A/P STRENGTH TESTS (+/-) Left Right Not Tested    Left Right Left Right Ant. Drawer   []   Hip Flex 70    Post. Drawer   []   Ext 0    Lachmans   []   ER 30    Valgus Stress   []   IR     Varus Stress   []   ABD 15    Devorahs   []   ADD     Apleys Comp.   []   Knee Flex wfl    Apleys Dist.   []   Ext 0    Hip Scouring   []   Ankle DF stiff    BILLs   []   PF wnl    Piriformis   []   INV wnl    Jhoans   []   EVER wnl    Talor Tilt   []        Pat-Fem Grind   []       OBSERVATION No Deficit Deficit Not Tested Comments   Posture       Forward Head [] [] []    Rounded Shoulders [] [] []    Kyphosis [] [] []    Lordosis [] [] []    Lateral Shift [] [] []    Scoliosis [] [] []    Iliac Crest [] [] []    PSIS [] [] []    ASIS [] [] []    Genu Valgus [] [] []    Genu Varus [] [] []    Genu Recurvatum [] [] []    Pronation [] [] []    Supination [] [] []    Leg Length Discrp [] [] []    Slumped Sitting [] [] []    Palpation [] [] []    Sensation [] [] []    Edema [] [] []    Neurological [] [] []    Patellar Mobility [] [] []    Patellar Orientation [] [] []    Gait [] [x] [] Analysis: rolling walker. Limited hip extension         FUNCTION Normal Difficult Unable   Sitting [x] [] []   Standing [] [x] []   Ambulation [] [x] []   Groom/Dress [] [] []   Lift/Carry [] [] [x]   Stairs [] [x] []   Bending [] [x] []   Squat [] [x] []   Kneel [] [] [x]     BALANCE/PROPRIOCEPTION              [x] Not tested   Single leg stance       R                     L                                PAIN   Eyes open                             Sec. Sec                  . []    Eyes closed                          Sec. Sec                  . []        FUNCTIONAL TESTS PAIN NO PAIN COMMENTS   Step Test 4 [] []    6 [] []    8 [] []    Squat [] []      Functional Test: LEFI Score: 75% functionally impaired     Comments:    Assessment:  Patient would benefit from skilled physical therapy services in order to: improve L hip ROM and strength so that she is more functional with the most physical activity being walking 2 miles/day    Problems:    [x] ? Pain:  [x] ? ROM:  [x] ? Strength:  [x] ? Function:  [] Other:       STG: (to be met in 10 treatments)  1. ? Pain: <2/10 at the worst  2. ? ROM: to 90% of normal limits  3. ? Strength: at least 4/5 with hip abd, ER and ext<40% limited   4. ? Function:able to walk without deviation of device  5. Patient to be independent with home exercise program as demonstrated by performance with correct form without cues. 6. Demonstrate Knowledge of fall prevention  LTG: (to be met in 20 treatments)  1. No pain in L hip  2. Full ROM of L hip  3. At least 4+/5 with all L hip movements  4. <10% interference with on LEFS  5. Able to walk 2 miles every day                   Patient goals: to nemesio to dress herself, was walking up to 2 miles/day on a treadmill, up/down stairs     Rehab Potential:  [x] Good  [] Fair  [] Poor   Suggested Professional Referral:  [x] No  [] Yes:  Barriers to Goal Achievement:  [x] No  [] Yes:  Domestic Concerns:  [x] No  [] Yes:    Pt. Education:  [x] Plans/Goals, Risks/Benefits discussed  [x] Home exercise program    Method of Education: [x] Verbal  [x] Demo  [x] Written  Comprehension of Education:  [] Verbalizes understanding. [] Demonstrates understanding. [x] Needs Review. [] Demonstrates/verbalizes understanding of HEP/Ed previously given.     Treatment Plan:  [x] Therapeutic Exercise   55289  [] Iontophoresis: 4 mg/mL Dexamethasone Sodium Phosphate  mAmin  82567   [x] Therapeutic Activity  56530 [x] Vasopneumatic cold with compression  02131    [x] Gait Training   83535 [] Ultrasound   32897   [x] Neuromuscular Re-education  53879 [] Electrical Stimulation Unattended  65433   [x] Manual Therapy  23345 [] Electrical Stimulation Attended  72079   [x] Instruction in HEP  [] Lumbar/Cervical Traction  17412   [x] Aquatic Therapy   76534 [] Cold/hotpack    [] Massage   58888 [] Dry Needling, 1 or 2 muscles  80687   [] Biofeedback, first 15 minutes   03269  [] Biofeedback, additional 15 minutes   32082 [] Dry Needling, 3 or more muscles  84046       Frequency:  2 x/week for 18 visits        Todays Treatment:  Modalities:   Treatment Location  Left      Right                          Position   hip [x]          []  [] Supine    [] Prone   [] Side lying  [] Sitting          Treatment Modality   2 Vasocompression    34° temp    Med pressure     15min   1 Other:  ex       Precautions:\"no hip precautions\"  Exercises:  Exercise Reps/ Time Weight/ Level Issued for HEP Completed Comments   Nu step *               Supine        Quad sets 10x10\"  x x    LAQ 2x15 2 lbs x x    Heelslides 20  x x W/ strap   Bridges  2x15  x x    Seated        LAQ 2x15 2 lbs x x    Gym        [employer] squats/HR 2x15 L20  x                                    Other:    Specific Instructions for next treatment:  1. Continue and progress her ROM/strength program  2. Finish with Game Ready    Evaluation Complexity:  History (Personal factors, comorbidities) [x] 0 [] 1-2 [] 3+   Exam (limitations, restrictions) [x] 1-2 [] 3 [] 4+   Clinical presentation (progression) [x] Stable [] Evolving  [] Unstable   Decision Making [x] Low [] Moderate [] High    [x] Low Complexity [] Moderate Complexity [] High Complexity       Treatment Charges: Mins Units   [x] Evaluation       [x]  Low       []  Moderate       []  High  1   []  Modalities     [x]  Ther Exercise 20 1   []  Manual Therapy     []  Ther Activities     []  Aquatics     [x]  Vasocompression 15 1   []  Other       TOTAL TREATMENT TIME:     Time in:1406   Time Out:    Electronically signed by: Joe Montero PT        Physician Signature:________________________________Date:__________________  By signing above or cosigning this note, I have reviewed this plan of care and certify a need for medically necessary rehabilitation services.      *PLEASE SIGN ABOVE AND FAX BACK ALL PAGES*

## 2021-09-13 NOTE — CARE COORDINATION
Medicare Cap   [x] Physical Therapy  [] Speech Therapy  [] Occupational therapy  *PT and Speech caps combine      $2100 Limit for PT and Speech combined  $2100 Limit for OT individually  At the beginning of the month where you expect to go over $2100, please add the [address] [place] modifier      Patient Name: Rony Mendoza  YOB: 1945    Note:  This is an estimate of charges billed.      Date of [address] Name # units/ charge $$$ charge Daily Total Charge Ongoing Total $$$   9/13/21 MARY, bob, vaso 1,1,1  130.06 130.06

## 2021-09-14 ENCOUNTER — HOSPITAL ENCOUNTER (OUTPATIENT)
Dept: PHYSICAL THERAPY | Facility: CLINIC | Age: 76
Setting detail: THERAPIES SERIES
Discharge: HOME | End: 2021-09-14
Payer: MEDICARE

## 2021-09-14 PROCEDURE — 97110 THERAPEUTIC EXERCISES: CPT

## 2021-09-14 PROCEDURE — 97016 VASOPNEUMATIC DEVICE THERAPY: CPT

## 2021-09-14 NOTE — FLOWSHEET NOTE
[] [facility]  [facility]  [address]  [phone]  F: [phone] [] [address]  [place] [address]   [address]  P: [phone]  F: [phone] [] [address] &  Therapy  [address] [facility]  P: [phone]  F: [phone] [] [address]  P: [phone]  F: [phone] [] [address]  [facility]   [address]   [place]: [phone]  F: [phone]      Physical Therapy Daily Treatment Note    Date:  2021  Patient Name:  Nalani Cushing    :  1945  MRN: [id number]  Physician: Dr Cr Persons: medicare  Medical Diagnosis: L NAI                Rehab Codes: W96.942, M25.552, R26.89  Onset date: 21               Next 's appt.: 10/?   Visit# / total visits: ; Progress note for Medicare patient due at visit 10     Cancels/No Shows:     Subjective:    Pain:  [x] Yes  [] No Location: L hip Pain Rating: (0-10 scale) 3/10  Pain altered Tx:  [] No  [] Yes  Action:  Comments: Pt sore from having therapy yesterday. Ambulating with RW.     Objective:  Modalities:   Treatment Location  Left      Right                          Position   hip [x]? []?  []? Supine    []? Prone   []? Side lying  []?  Sitting                                            Treatment Modality   2 Vasocompression    34° temp    Med pressure     15min   1 Other:  ex         Precautions:\"no hip precautions\"  Exercises:  Exercise Reps/ Time Weight/ Level Issued for HEP Completed Comments   Nu step 6' L1         SB/HS stretches 3x30\"                       TG squats/HR 2x15 L20  x    Marches x20   x    HS curl x20   x    3 way hip x10 B AROM  x    Step Ups x15 4\"  x                  Supine           understanding. [] Needs review. [] Demonstrates/verbalizes HEP/Ed previously given. Plan: [x] Continue current frequency toward long and short term goals.     [] Specific Instructions for subsequent treatments:       Time In: 11:00 am            Time Out: 11:56 am    Electronically signed by:  Rita Butler PTA

## 2021-09-17 ENCOUNTER — APPOINTMENT (OUTPATIENT)
Dept: PHYSICAL THERAPY | Facility: CLINIC | Age: 76
End: 2021-09-17
Payer: MEDICARE

## 2021-09-20 ENCOUNTER — HOSPITAL ENCOUNTER (OUTPATIENT)
Dept: PHYSICAL THERAPY | Facility: CLINIC | Age: 76
Setting detail: THERAPIES SERIES
Discharge: HOME | End: 2021-09-20
Payer: MEDICARE

## 2021-09-20 PROCEDURE — 97110 THERAPEUTIC EXERCISES: CPT

## 2021-09-20 PROCEDURE — 97016 VASOPNEUMATIC DEVICE THERAPY: CPT

## 2021-09-20 NOTE — FLOWSHEET NOTE
curl x20   x    3 way hip x15 B AROM  x    Step Ups x15 4\"  x     Side to side walk   3L     x     Supine             Quad sets 20   x x     SAQ 2x15 2 lbs x x     Heelslides 20   x x W/ strap   Bridges  2x15   x x                     Seated             LAQ 2x15 2 lbs x x                                                                          Other:     Specific Instructions for next treatment:  1. Continue and progress her ROM/strength program  2. Finish with Game Ready      Treatment Charges: Mins Units   []  Modalities     [x]  Ther Exercise 40 3   []  Manual Therapy     []  Ther Activities     []  Aquatics     [x]  Vasocompression 15 1   []  Other     Total Treatment time 60 4       Assessment: [x] Progressing toward goals. Initiated session with Sci fit warm up followed by stretches. Added standing ex as noted in log above with cues to keep within gentle ROM, good carry over. Pt experienced some groin tightness with hip abd and TG, but ended post ex. Reviewed HEP and issued updated handout. Cont to end with vaso for sx relief. [] No change. [] Other:  [x] Patient would continue to benefit from skilled physical therapy services in order to: improve L hip ROM and strength so that she is more functional with the most physical activity being walking 2 miles/day     STG: (to be met in 10 treatments)  1. ? Pain: <2/10 at the worst  2. ? ROM: to 90% of normal limits  3. ? Strength: at least 4/5 with hip abd, ER and ext<40% limited   4. ? Function:able to walk without deviation of device  5. Patient to be independent with home exercise program as demonstrated by performance with correct form without cues. 6. Demonstrate Knowledge of fall prevention  LTG: (to be met in 20 treatments)  1. No pain in L hip  2. Full ROM of L hip  3. At least 4+/5 with all L hip movements  4. <10% interference with on LEFS  5. Able to walk 2 miles every day    Pt.  Education:  [x] Yes  [] No  [] Reviewed Prior HEP/Ed  Method of Education: [] Verbal  [x] Demo  [x] Written  Comprehension of Education:  [x] Verbalizes understanding. [x] Demonstrates understanding. [] Needs review. [] Demonstrates/verbalizes HEP/Ed previously given. Plan: [x] Continue current frequency toward long and short term goals.     [] Specific Instructions for subsequent treatments:       Time In: 0900            Time Out: 1000    Electronically signed by:  Easton Morris PT

## 2021-09-23 ENCOUNTER — HOSPITAL ENCOUNTER (OUTPATIENT)
Dept: PHYSICAL THERAPY | Facility: CLINIC | Age: 76
Setting detail: THERAPIES SERIES
Discharge: HOME | End: 2021-09-23
Payer: MEDICARE

## 2021-09-23 NOTE — FLOWSHEET NOTE
[] [facility]) - [facility] &  Therapy  [address]    [phone]  F: [phone]   [] [address]  [facility]   [address]  P: [phone]  F: [phone]  [] [facility] &  Therapy  [address] [facility]  P: [phone]  F: [phone] [] [address]  P: [phone]  F: [phone]  [] [address]  [facility]   [address]   [place]: [phone]  F: [phone]   [] [place] [address] [facility] [address]  [place]: [phone]   F: [phone]     Physical Therapy Cancel/No Show note    Date: 2021  Patient: Hobart Kocher  : 1945  MRN: [id number]    Cancels/No Shows to date:     For today's appointment patient:    [x]  Cancelled    [] Rescheduled appointment    [] No-show     Reason given by patient:    [x]  Patient ill-stomach flu?    []  Conflicting appointment    [] No transportation      [] Conflict with work    [] No reason given    [] Weather related    [] COVID-19    [] Other:      Comments:        [] Next appointment was confirmed    Electronically signed by: Manoj Finch PT

## 2021-09-27 ENCOUNTER — APPOINTMENT (OUTPATIENT)
Dept: PHYSICAL THERAPY | Facility: CLINIC | Age: 76
End: 2021-09-27
Payer: MEDICARE

## 2021-09-30 ENCOUNTER — HOSPITAL ENCOUNTER (OUTPATIENT)
Dept: PHYSICAL THERAPY | Facility: CLINIC | Age: 76
Setting detail: THERAPIES SERIES
Discharge: HOME | End: 2021-09-30
Payer: MEDICARE

## 2021-09-30 PROCEDURE — 97110 THERAPEUTIC EXERCISES: CPT

## 2021-09-30 PROCEDURE — 97016 VASOPNEUMATIC DEVICE THERAPY: CPT

## 2021-09-30 NOTE — FLOWSHEET NOTE
[] [address] [facility] &  Therapy  [address]  [phone]  F: [phone] [] [address]  [employer]   [address]  P: [phone]  F: [phone] [] [address] &  Therapy  [address] [facility]  P: [phone]  F: [phone] [] [address]  P: [phone]  F: [phone] [] [address]  [facility]   [address]   [place]: [phone]  F: [phone]      Physical Therapy Daily Treatment Note    Date:  2021  Patient Name:  Brittney Justice    :  1945  MRN: [id number]  Physician: Dr Anish Feldman: medicare  Medical Diagnosis: L NAI                Rehab Codes: X41.028, M25.552, R26.89  Onset date: 21               Next 's appt.: 10/   Visit# / total visits: ; Progress note for Medicare patient due at visit 10     Cancels/No Shows: 0    Subjective:    Pain:  [x] Yes  [] No Location: L hip Pain Rating: (0-10 scale) 0/10  Pain altered Tx:  [] No  [] Yes  Action:  Comments: Pt arrived without pain today. Non-compliant to HEP d/t busy work schedule and long days \"on my feet\". Objective:  Modalities:   Treatment Location  Left      Right                          Position   hip [x]? []?  []? Supine    []? Prone   []? Side lying  []?  Sitting                                            Treatment Modality   2 Vasocompression    34° temp    Med pressure     15min   1 Other:  ex         Precautions:\"no hip precautions\"  Exercises:  Exercise Reps/ Time Weight/ Level Issued for HEP Completed Comments   Sci fit  8 min L1 - seat 8  x            Nu step 6' L1         SB/HS stretches 3x30\"                       TG squats/HR 2x17 L20  x    Marches x30 2#  x    HS curl x30 2# x    3 way hip x15 B AROM-2#  x    Step Ups x15 4\"  x    Side to side walk   3L 2#   x     Supine             Quad sets 20x   x x     SAQ 2x15 3 lbs x x     Heelslides 20x   x x W/ strap   Bridges  2x15   x x                     Seated             LAQ 2x15 3 lbs x x                                                                          Other:     Specific Instructions for next treatment:  1. Continue and progress her ROM/strength program  2. Finish with Game Ready      Treatment Charges: Mins Units   []  Modalities     [x]  Ther Exercise 40 3   []  Manual Therapy     []  Ther Activities     []  Aquatics     [x]  Vasocompression 15 1   []  Other     Total Treatment time 60 4       Assessment: [x] Progressing toward goals. Initiated session with SciFit for warmup prior to stretching. Continued with therex per flow sheet above with good tolerance to progressions. Able to increase weight for mat work along with the addition of weights to standing program. Pt verbalized LE fatigue however denied any onset of pain or discomfort. Ended session with vaso for symptom control with good relief noted. Discussed gait training with less restrictive device in upcoming visits. Encouraged HEP compliance in order to progress and obtain goals. [] No change. [] Other:  [x] Patient would continue to benefit from skilled physical therapy services in order to: improve L hip ROM and strength so that she is more functional with the most physical activity being walking 2 miles/day     STG: (to be met in 10 treatments)  1. ? Pain: <2/10 at the worst  2. ? ROM: to 90% of normal limits  3. ? Strength: at least 4/5 with hip abd, ER and ext<40% limited   4. ? Function:able to walk without deviation of device  5. Patient to be independent with home exercise program as demonstrated by performance with correct form without cues. 6. Demonstrate Knowledge of fall prevention  LTG: (to be met in 20 treatments)  1.  No pain in L hip  2. Full ROM of L hip  3. At least 4+/5 with all L hip movements  4. <10% interference with on LEFS  5. Able to walk 2 miles every day    Pt. Education:  [x] Yes  [] No  [] Reviewed Prior HEP/Ed  Method of Education: [] Verbal  [x] Demo  [x] Written  Comprehension of Education:  [x] Verbalizes understanding. [x] Demonstrates understanding. [] Needs review. [] Demonstrates/verbalizes HEP/Ed previously given. Plan: [x] Continue current frequency toward long and short term goals. [] Specific Instructions for subsequent treatments:       Time In: 4:30 pm           Time Out: 5:35 pm    Electronically signed by:   Hieu Brizuela [place]

## 2021-10-04 ENCOUNTER — HOSPITAL ENCOUNTER (OUTPATIENT)
Dept: PHYSICAL THERAPY | Facility: CLINIC | Age: 76
Setting detail: THERAPIES SERIES
Discharge: HOME | End: 2021-10-04
Payer: MEDICARE

## 2021-10-04 PROCEDURE — 97110 THERAPEUTIC EXERCISES: CPT

## 2021-10-04 PROCEDURE — 97016 VASOPNEUMATIC DEVICE THERAPY: CPT

## 2021-10-04 NOTE — CARE COORDINATION
Medicare Cap   [x] Physical Therapy  [] Speech Therapy  [] Occupational therapy  *PT and Speech caps combine      $2100 Limit for PT and Speech combined  $2100 Limit for OT individually  At the beginning of the month where you expect to go over $2100, please add the [address] [place] modifier      Patient Name: Akila Kelly  YOB: 1945    Note:  This is an estimate of charges billed.      Date of [address] Name # units/ charge $$$ charge Daily Total Charge Ongoing Total $$$   9/13/21 IE, bob, vaso 1,1,1  130.06 130.06   9/14/21 2 therex+ vaso  29.21+22.84+9.21 61.26 191.32   9/20 3 therex+vaso    275.42   9/30 3 therex+vaso  29.21+22.84+22.84+9.21  359.52   10/4 3 therex+vaso   84.10 443.62

## 2021-10-04 NOTE — FLOWSHEET NOTE
x    Side to side walk   3L 2#                Supine             Quad sets 20x   x      SAQ 2x15 4 lbs x x     Heelslides 20x   x  W/ strap   Bridges  2x15   x x                     Seated             LAQ 2x15 4 lbs x x                                                                          Other:     Specific Instructions for next treatment:  1. Continue and progress her ROM/strength program  2. Finish with Game Ready      Treatment Charges: Mins Units   []  Modalities     [x]  Ther Exercise 40 3   []  Manual Therapy     []  Ther Activities     []  Aquatics     [x]  Vasocompression 15 1   []  Other     Total Treatment time 60 4       Assessment: [x] Progressing toward goals. Progressed pt with increased step height and increased weight for LAQ and SAQ. Able to ambulate safely around the clinic without AD. Completed all ex without any increase in pain, only muscle fatigue. Cont to end with vaso for soreness relief. [] No change. [] Other:  [x] Patient would continue to benefit from skilled physical therapy services in order to: improve L hip ROM and strength so that she is more functional with the most physical activity being walking 2 miles/day     STG: (to be met in 10 treatments)  1. ? Pain: <2/10 at the worst  2. ? ROM: to 90% of normal limits  3. ? Strength: at least 4/5 with hip abd, ER and ext<40% limited   4. ? Function:able to walk without deviation of device  5. Patient to be independent with home exercise program as demonstrated by performance with correct form without cues. 6. Demonstrate Knowledge of fall prevention  LTG: (to be met in 20 treatments)  1. No pain in L hip  2. Full ROM of L hip  3. At least 4+/5 with all L hip movements  4. <10% interference with on LEFS  5. Able to walk 2 miles every day    Pt. Education:  [x] Yes  [] No  [] Reviewed Prior HEP/Ed  Method of Education: [] Verbal  [x] Demo  [x] Written  Comprehension of Education:  [x] Verbalizes understanding.   [x] Demonstrates understanding. [] Needs review. [] Demonstrates/verbalizes HEP/Ed previously given. Plan: [x] Continue current frequency toward long and short term goals.     [] Specific Instructions for subsequent treatments:       Time In: 1:00 pm           Time Out: 2:00 pm    Electronically signed by:  Stevan Johnston PTA

## 2021-10-07 ENCOUNTER — HOSPITAL ENCOUNTER (OUTPATIENT)
Dept: PHYSICAL THERAPY | Facility: CLINIC | Age: 76
Setting detail: THERAPIES SERIES
Discharge: HOME | End: 2021-10-07
Payer: MEDICARE

## 2021-10-07 PROCEDURE — 97110 THERAPEUTIC EXERCISES: CPT

## 2021-10-07 PROCEDURE — 97016 VASOPNEUMATIC DEVICE THERAPY: CPT

## 2021-10-07 NOTE — FLOWSHEET NOTE
[] [facility]  [address]  [phone]  F: [phone] [] [address]  [facility]   [address]  P: [phone]  F: [phone] [] [address]  Therapy  [address] [facility]  P: [phone]  F: [phone] [] [address]  P: [phone]  F: [phone] [] [address]  [facility]   [address]   [place]: [phone]  F: [phone]      Physical Therapy Daily Treatment Note    Date:  10/7/2021  Patient Name:  Suzanne Roldan    :  1945  MRN: [id number]  Physician: Dr Esquivel [place]: medicare  Medical Diagnosis: L NAI                Rehab Codes: A38.631, M25.552, R26.89  Onset date: 21               Next 's appt.: 10/   Visit# / total visits: ; Progress note for Medicare patient due at visit 10     Cancels/No Shows: 0    Subjective:    Pain:  [x] Yes  [] No Location: L hip Pain Rating: (0-10 scale) 0/10  Pain altered Tx:  [] No  [] Yes  Action:  Comments: No pain to report at arrival. Ambulating without AD. Objective:  Modalities:   Treatment Location  Left      Right                          Position   hip [x]? []?  []? Supine    []? Prone   []? Side lying  []?  Sitting                                            Treatment Modality   2 Vasocompression    34° temp    Med pressure     10min   1 Other:  ex         Precautions:\"no hip precautions\"  Exercises:  Exercise Reps/ Time Weight/ Level Issued for HEP Completed Comments           Nu step 8' L3   x     SB/HS stretches 3x30\"                       TG squats/HR 2x15 L20  x    Marches x30 3#  x    HS curl x30 3#  x    3 way hip x15 B orange  x    Step Ups - fwd/lat x20 6\"  x    Mini Squat 2x10 understanding. [x] Demonstrates understanding. [] Needs review. [] Demonstrates/verbalizes HEP/Ed previously given. Plan: [x] Continue current frequency toward long and short term goals.     [] Specific Instructions for subsequent treatments:       Time In: 10:00 am        Time Out: 11:00 am    Electronically signed by:  Slim Jimenes PTA

## 2021-10-07 NOTE — CARE COORDINATION
Medicare Cap   [x] Physical Therapy  [] Speech Therapy  [] Occupational therapy  *PT and Speech caps combine      $2100 Limit for PT and Speech combined  $2100 Limit for OT individually  At the beginning of the month where you expect to go over $2100, please add the [address] [place] [address] modifier      Patient Name: Silvestre Snyder  YOB: 1945    Note:  This is an estimate of charges billed.      Date of [address] Name # units/ charge $$$ charge Daily Total Charge Ongoing Total $$$   9/13/21 IE, bob, vaso 1,1,1  130.06 130.06   9/14/21 2 therex+ vaso  29.21+22.84+9.21 61.26 191.32   9/20 3 therex+vaso    275.42   9/30 3 therex+vaso  29.21+22.84+22.84+9.21  359.52   10/4 3 therex+vaso   84.10 443.62   10/7 3 therex+vaso   84.10 527.72

## 2021-10-11 ENCOUNTER — APPOINTMENT (OUTPATIENT)
Dept: PHYSICAL THERAPY | Facility: CLINIC | Age: 76
End: 2021-10-11
Payer: MEDICARE

## 2021-10-13 ENCOUNTER — HOSPITAL ENCOUNTER (OUTPATIENT)
Dept: PHYSICAL THERAPY | Facility: CLINIC | Age: 76
Setting detail: THERAPIES SERIES
Discharge: HOME | End: 2021-10-13
Payer: MEDICARE

## 2021-10-13 PROCEDURE — 97110 THERAPEUTIC EXERCISES: CPT

## 2021-10-13 NOTE — FLOWSHEET NOTE
[] [facility]  [address]  [phone]  F: [phone] [] [address]  [place] [address]   [address]  P: [phone]  F: [phone] [x] [address]  The[address]  [address] [facility]  P: [phone]  F: [phone] [] [address]  P: [phone]  F: [phone] [] [address]  [facility]   [address]   [place]: [phone]  F: [phone]      Physical Therapy Daily Treatment/Progress Note    Date:  10/13/2021  Patient Name:  Kerry San    :  1945  MRN: [id number]  Physician: Dr Zaki Dangelo: medicare  Medical Diagnosis: L NAI                 Rehab Codes: H93.764, M25.552, R26.89  Onset date: 21                 Next 's appt.: none  Visit# / total visits: ; Progress note for Medicare patient due at visit 17     Cancels/No Shows: 0/0    Subjective:    Pain:  [] Yes  [x] No Location: L hip Pain Rating: (0-10 scale) 0/10  Pain altered Tx:  [x] No  [] Yes  Action:  Comments: rates herself at 80-85% back to normal.  Stamina is still an issue. Getting up and moving for the first 3 steps are a little wobbly. Getting up from the floor is still limited when playing with the [employer]. At night, she sleeps on her sides. Reviewed use of a pillow between the knees. Walking on a treadmill for 15 min on a daily basis at 2.5-3.0 mph and started this week.       Objective:  Modalities: Game ReadyPRN  Precautions:\"no hip precautions\"  Exercises:  Exercise Reps/ Time Weight/ Level Issued for HEP Completed Comments   Bike 5'   x            Lunges 2x10  10/13 x    4 way hip 2x10 lime 10/13 x    Step Ups - fwd/lat 2x10 6\" 10/13 x    Sit to stands 2x10  10/13 x            Supine             SAQ 2x15 7 lbs 10/13 x     Bridges  2x15   10/13 x     Clamshells 2x10  10/13 x    Hip abd 2x10  10/13 x    Yunior stretch 3x30\"  10/13 x    Seated             LAQ 2x10 7 lbs 10/13 x                                                                          Other:     Specific Instructions for next treatment:  1. Continue and progress her ROM/strength program        Treatment Charges: Mins Units   []  Modalities     [x]  Ther Exercise 48 3   []  Manual Therapy     []  Ther Activities     []  Aquatics     [x]  Vasocompression     []  Other     Total Treatment time 48 3        Assessment: [x] Progressing toward goals. Advanced her program appropriately. She had no increase in pain, but did feel fatigue. She is very pleased with her status and progress. She feels that the advancements of her program are good. [] No change. [] Other:  [x] Patient would continue to benefit from skilled physical therapy services in order to: improve L hip ROM and strength so that she is more functional with the most physical activity being walking 2 miles/day     STG: (to be met in 10 treatments)  1. ? Pain: <2/10 at the worst MET  2. ? ROM: to 90% of normal limits  3. ? Strength: at least 4/5 with hip abd, ER and ext<40% limited   4. ? Function:able to walk without deviation of device  5. Patient to be independent with home exercise program as demonstrated by performance with correct form without cues. 6. Demonstrate Knowledge of fall prevention  LTG: (to be met in 20 treatments)  1. No pain in L hip  2. Full ROM of L hip  3. At least 4+/5 with all L hip movements  4. <10% interference with on LEFS  5. Able to walk 2 miles every day    Pt. Education:  [x] Yes  [] No  [x] Reviewed Prior HEP/Ed  Method of Education: [] Verbal  [x] Demo  [x] Written  Access Code: [email or web address]  URL: [email or web address]  Date: 10/13/2021  Prepared by: Didi Robledo    Exercises  Short Arc Quad with Ankle Weight - 2 x daily - 7 x weekly - 2 sets - 15 reps  Single Leg Bridge - 2 x daily - 7 x weekly - 2 sets - 10 reps  Seated Long Arc Quad with Ankle Weight - 1 x daily - 7 x weekly - 3 sets - 10 reps  Hip Flexor Stretch at Edge of Bed - 2 x daily - 7 x weekly - 1 sets - 3 reps - 30 sec hold  Sidelying Hip Abduction - 2 x daily - 7 x weekly - 2 sets - 10 reps  Clamshell - 2 x daily - 7 x weekly - 2 sets - 10 reps  Sit to Stand - 2 x daily - 7 x weekly - 2 sets - 10 reps  Lateral Step Down - 2 x daily - 7 x weekly - 2 sets - 10-20 reps  Backward Step Down - 2 x daily - 7 x weekly - 2 sets - 20 reps  Mini Lunge - 2 x daily - 7 x weekly - 2 sets - 10 reps  Standing Repeated Hip Flexion with Resistance - 1 x daily - 7 x weekly - 3 sets - 10 reps  Standing Hip Extension with Anchored Resistance - 2 x daily - 7 x weekly - 2 sets - 10 reps  Standing Hip Abduction with Anchored Resistance - 2 x daily - 7 x weekly - 2 sets - 10 reps  Standing Hip Adduction with Anchored Resistance - 2 x daily - 7 x weekly - 2 sets - 10 reps    Comprehension of Education:  [] Verbalizes understanding. [] Demonstrates understanding. [] Needs review. [x] Demonstrates/verbalizes HEP/Ed previously given. Plan: [x] Continue current frequency toward long and short term goals.     [] Specific Instructions for subsequent treatments:       Time In: 1130       Time Out: 0651    Electronically signed by:  Jamie Wright PT

## 2021-10-13 NOTE — CARE COORDINATION
Medicare Cap   [x] Physical Therapy  [] Speech Therapy  [] Occupational therapy  *PT and Speech caps combine      $2100 Limit for PT and Speech combined  $2100 Limit for OT individually  At the beginning of the month where you expect to go over $2100, please add the [address] [place] modifier      Patient Name: Brittney Justice  YOB: 1945    Note:  This is an estimate of charges billed.      Date of [address] Name # units/ charge $$$ charge Daily Total Charge Ongoing Total $$$   9/13/21 IE, bob, vaso 1,1,1  130.06 130.06   9/14/21 2 therex+ vaso  29.21+22.84+9.21 61.26 191.32   9/20 3 therex+vaso    275.42   9/30 3 therex+vaso  29.21+22.84+22.84+9.21  359.52   10/4 3 therex+vaso   84.10 443.62   10/7 3 therex+vaso   84.10 527.72   10/13 bob 3  74.898 602.61

## 2021-10-14 ENCOUNTER — HOSPITAL ENCOUNTER (OUTPATIENT)
Dept: PHYSICAL THERAPY | Facility: CLINIC | Age: 76
Setting detail: THERAPIES SERIES
Discharge: HOME | End: 2021-10-14
Payer: MEDICARE

## 2021-10-14 PROCEDURE — 97110 THERAPEUTIC EXERCISES: CPT

## 2021-10-14 NOTE — CARE COORDINATION
Medicare Cap   [x] Physical Therapy  [] Speech Therapy  [] Occupational therapy  *PT and Speech caps combine      $2100 Limit for PT and Speech combined  $2100 Limit for OT individually  At the beginning of the month where you expect to go over $2100, please add the [address] [place] modifier      Patient Name: Pawel Davis  YOB: 1945    Note:  This is an estimate of charges billed.      Date of [address] Name # units/ charge $$$ charge Daily Total Charge Ongoing Total $$$   9/13/21 IE, bob, vaso 1,1,1  130.06 130.06   9/14/21 2 therex+ vaso  29.21+22.84+9.21 61.26 191.32   9/20 3 therex+vaso    275.42   9/30 3 therex+vaso  29.21+22.84+22.84+9.21  359.52   10/4 3 therex+vaso   84.10 443.62   10/7 3 therex+vaso   84.10 527.72   10/13 bob 3  74.898 602.61   10/14 TE 3  74.89 677.50

## 2021-10-14 NOTE — FLOWSHEET NOTE
Other:     Specific Instructions for next treatment:  1. Continue and progress her ROM/strength program        Treatment Charges: Mins Units   []  Modalities     [x]  Ther Exercise 50 3   []  Manual Therapy     []  Ther Activities     []  Aquatics     [x]  Vasocompression     []  Other     Total Treatment time 50 3        Assessment: [x] Progressing toward goals. Continued with exercise program as noted above with good tolerance despite soreness upon arrival. Pt reported her \"knees bother her\" mostly with standing exercises. Pt visibly fatigued post session however is pleased she was able to get through everything and not have any increased sx. Reminded pt to utilize pillow between knees as pt stated she had forgotten about the suggestion. Will cont to progress as tolerated. [] No change. [] Other:  [x] Patient would continue to benefit from skilled physical therapy services in order to: improve L hip ROM and strength so that she is more functional with the most physical activity being walking 2 miles/day     STG: (to be met in 10 treatments)  1. ? Pain: <2/10 at the worst MET  2. ? ROM: to 90% of normal limits  3. ? Strength: at least 4/5 with hip abd, ER and ext<40% limited   4. ? Function:able to walk without deviation of device  5. Patient to be independent with home exercise program as demonstrated by performance with correct form without cues. 6. Demonstrate Knowledge of fall prevention  LTG: (to be met in 20 treatments)  1. No pain in L hip  2. Full ROM of L hip  3. At least 4+/5 with all L hip movements  4. <10% interference with on LEFS  5. Able to walk 2 miles every day    Pt. Education:  [x] Yes  [] No  [x] Reviewed Prior HEP/Ed  Method of Education: [] Verbal  [x] Demo  [x] Written  Access Code: [email or web address]  URL: [email or web address]  Date: 10/13/2021  Prepared by: Anuj Starkey    Exercises  Short Arc Quad with Ankle Weight - 2 x daily - 7 x weekly - 2 sets - 15 reps  Single Leg Bridge - 2 x daily - 7 x weekly - 2 sets - 10 reps  Seated Long Arc Quad with Ankle Weight - 1 x daily - 7 x weekly - 3 sets - 10 reps  Hip Flexor Stretch at Edge of Bed - 2 x daily - 7 x weekly - 1 sets - 3 reps - 30 sec hold  Sidelying Hip Abduction - 2 x daily - 7 x weekly - 2 sets - 10 reps  Clamshell - 2 x daily - 7 x weekly - 2 sets - 10 reps  Sit to Stand - 2 x daily - 7 x weekly - 2 sets - 10 reps  Lateral Step Down - 2 x daily - 7 x weekly - 2 sets - 10-20 reps  Backward Step Down - 2 x daily - 7 x weekly - 2 sets - 20 reps  Mini Lunge - 2 x daily - 7 x weekly - 2 sets - 10 reps  Standing Repeated Hip Flexion with Resistance - 1 x daily - 7 x weekly - 3 sets - 10 reps  Standing Hip Extension with Anchored Resistance - 2 x daily - 7 x weekly - 2 sets - 10 reps  Standing Hip Abduction with Anchored Resistance - 2 x daily - 7 x weekly - 2 sets - 10 reps  Standing Hip Adduction with Anchored Resistance - 2 x daily - 7 x weekly - 2 sets - 10 reps    Comprehension of Education:  [] Verbalizes understanding. [] Demonstrates understanding. [] Needs review. [x] Demonstrates/verbalizes HEP/Ed previously given. Plan: [x] Continue current frequency toward long and short term goals. [] Specific Instructions for subsequent treatments:       Time In: 9:00       Time Out: 10:00 am    Electronically signed by:   Sarah Magana, [place]

## 2021-10-15 ENCOUNTER — VIRTUAL VISIT (OUTPATIENT)
Dept: FAMILY MEDICINE CLINIC | Age: 76
End: 2021-10-15
Payer: MEDICARE

## 2021-10-15 DIAGNOSIS — E55.9 VITAMIN D DEFICIENCY: Primary | ICD-10-CM

## 2021-10-15 DIAGNOSIS — E78.5 HYPERLIPIDEMIA, UNSPECIFIED HYPERLIPIDEMIA TYPE: ICD-10-CM

## 2021-10-15 DIAGNOSIS — Z00.00 PREVENTATIVE HEALTH CARE: ICD-10-CM

## 2021-10-15 DIAGNOSIS — I10 ESSENTIAL HYPERTENSION: ICD-10-CM

## 2021-10-15 DIAGNOSIS — R53.83 FATIGUE, UNSPECIFIED TYPE: ICD-10-CM

## 2021-10-15 PROCEDURE — 99214 OFFICE O/P EST MOD 30 MIN: CPT | Performed by: NURSE PRACTITIONER

## 2021-10-15 ASSESSMENT — ENCOUNTER SYMPTOMS
CONSTIPATION: 0
ABDOMINAL PAIN: 0
NAUSEA: 0
BACK PAIN: 0
SORE THROAT: 0
COLOR CHANGE: 0
ABDOMINAL DISTENTION: 0
COUGH: 0
CHEST TIGHTNESS: 0
SHORTNESS OF BREATH: 0
RHINORRHEA: 0
DIARRHEA: 0

## 2021-10-15 NOTE — PROGRESS NOTES
Jonny Bong, APRN-CNP    [address] [facility]  [id number]  [address] [address]  Dept: [phone]  Dept Fax: [phone]     Patient ID: Nell Flaherty is a 67 y.o. female Established patient    HPI    Pt here today for f/u on chronic medical problems, go over labs and/or diagnostic studies, and medication refills. Pt denies any fever or chills. Pt today denies any HA, chest pain, or SOB. Pt denies any N/V/D/C or abdominal pain. -seen ortho last Tuesday and currently doing PT.   - 4 days counseling for 3 weeks so she is tired and dtr and son moved in while apartment is getting ready.   - dr. Allison Gill office is where she had dexa scan done last year. Otherwise pt doing well on current tx and no other concerns today. The patient's past medical, surgical, social, and family history as well as his current medications and allergies were reviewed as documented in today's encounter IVANA Zuniga. Previous office notes, labs, imaging and hospital records were reviewed prior to and during encounter. Current Outpatient Medications on File Prior to Visit   Medication Sig Dispense Refill    ibuprofen (ADVIL;MOTRIN) 600 MG tablet Take 1 tablet by mouth 3 times daily as needed      Multiple Vitamins-Minerals (MULTIVITAMIN ADULT PO) Take 1 tablet by mouth daily      calcium carbonate (OSCAL) 500 MG TABS tablet Take 500 mg by mouth daily      fluticasone (FLONASE) 50 MCG/ACT nasal spray 2 sprays by Nasal route daily 1 Bottle 0    Coenzyme Q10 (COQ10) 100 MG CAPS Take 1 capsule by mouth daily       loratadine (CLARITIN) 10 MG tablet Take 10 mg by mouth daily as needed (Seasonal allergies)       Biotin 1000 MCG TABS Take 1,000 mcg by mouth daily        No current facility-administered medications on file prior to visit. Subjective:     Review of Systems   Constitutional: Positive for fatigue.  Negative for activity change and fever.   HENT: Negative for congestion, ear pain, rhinorrhea and sore throat. Respiratory: Negative for cough, chest tightness and shortness of breath. Cardiovascular: Negative for chest pain and palpitations. Gastrointestinal: Negative for abdominal distention, abdominal pain, constipation, diarrhea and nausea. Endocrine: Negative for polydipsia, polyphagia and polyuria. Genitourinary: Negative for difficulty urinating and dysuria. Musculoskeletal: Negative for arthralgias, back pain and myalgias. Skin: Negative for color change and rash. Neurological: Negative for dizziness, weakness, light-headedness and headaches. Hematological: Negative for adenopathy. Psychiatric/Behavioral: Negative for agitation and behavioral problems. The patient is not nervous/anxious. Objective:     Physical Exam  PHYSICAL EXAMINATION:  [ INSTRUCTIONS:  \"[x]\" Indicates a positive item  \"[]\" Indicates a negative item  -- DELETE ALL ITEMS NOT EXAMINED]  Vital Signs: Not completed due to virtual visit. Constitutional: [x] Appears well-developed and well-nourished [x] No apparent distress                            [] Abnormal-   Mental status  [x] Alert and awake  [x] Oriented to person/place/time [x]Able to follow commands       Eyes:  EOM    [x]  Normal  [] Abnormal-  Sclera  [x]  Normal  [] Abnormal -         Discharge [x]  None visible  [] Abnormal -     HENT:   [x] Normocephalic, atraumatic.   [] Abnormal   [x] Mouth/Throat: Mucous membranes are moist.      External Ears [x] Normal  [] Abnormal-      Neck: [x] No visualized mass      Pulmonary/Chest: [x] Respiratory effort normal.  [x] No visualized signs of difficulty breathing or respiratory distress        [] Abnormal-      Neurological:        [x] No Facial Asymmetry (Cranial nerve 7 motor function) (limited exam to video visit)                       [x] No gaze palsy        [] Abnormal-         Skin:                     [x] No significant exanthematous lesions or discoloration noted on facial skin         [] Abnormal-                                  Psychiatric:           [x] Normal Affect [x] No Hallucinations        [] Abnormal-      Other pertinent observable physical exam findings- Patient appears generally well, is speaking full sentences clearly without any observable SOB, no cough, no diaphoresis. Assessment:      Diagnosis Orders   1. Vitamin D deficiency  Vitamin D 25 Hydroxy   2. Hyperlipidemia, unspecified hyperlipidemia type  Lipid Panel   3. Essential hypertension     4. Preventative health care  CBC    Comprehensive Metabolic Panel    Hemoglobin A1C   5. Fatigue, unspecified type  TSH without Reflex    T4, Free     Plan:     Vitamin D deficiency  - Stable: con't current tx plan   -will re-check Vitamin D level  - Please increase foods with Vitamin D, which include cheese, eggs, cereals, yogurt, milk, tofu, any type of beef, salmon or tuna,  spinach, and mushroom. Hyperlipidemia, unspecified hyperlipidemia type  - Stable: con't current tx plan  - Lifestyle changes: Decrease fats, sugars, carbohydrates, and increase routine exercise, try to get 150 minutes of aerobic activity a week  - Foods that helps increase HDL include the following: Fish, nuts, flax, avocados, and legumes (such as soy, kidney beans, chickpeas). - Will cont with low fat/chol diet     Essential hypertension  - Stable:con't current tx plan  - Education provided on maintaining a low sodium diet and routine exercise. - Advised to seek emergent tx if SBP>180 and/or DBP>110, any chest pain, h/a or vision changes    - follow up 3 months, above ordered labs to be completed before next visit. - pt verbalized understanding plan of care. Medications, labs, diagnostic studies, consultations and follow-up as documented in this encounter.  Rest of systems unchanged, continue current treatments    On this date 10/15/2021 I have spent 30 minutes reviewing previous notes, test results and face to face with the patient discussing the diagnosis and importance of compliance with the treatment plan as well as documenting on the day of the visit.     Becca Mortensen, APRN-CNP

## 2021-10-15 NOTE — PATIENT INSTRUCTIONS
Health Maintenance Recommendations  Exercise   · I generally recommend that people of all ages try to get 150 minutes of physical activity per week and it doesnt matter how this totals up, in other words 30 minutes 5 days per week is as good as 50 minutes 3 days a week and so on. · The level of activity should be such that it is able to get your heart rate up to 100 or more, for example a brisk walk should achieve this rate. Dietary Recommendations  · In terms of diet, I generally recommend trying to eat a healthy well balanced diet full of fruits and vegetables. Avoid carbonated drinks and fruit juices and limit your alcohol use. · Avoid processed foods wherever possible (anything that comes in a can or a box) which can be achieved by sticking to the outside walls of the grocery store where generally you will find fresh fruits/vegetables, meats, dairy, and frozen foods. · Try to avoid starches in the diet where possible and minimize bread, rice, potatoes, and pasta in the diet. Specifically try to avoid gluten, which even in people that dont have a deysi allergy, causes havoc in the small intestine and alters absorption of nutrients which can in turn lead to obesity. Sleep  · Try to achieve a regular sleep schedule, waking and laying down at the same time each night. Most people need 7 hours per night plus or minus 2 hours. · You will know that youre getting enough because you will wake feeling refreshed and not need to sleep in to catch up on weekends. Skin Care  · Make sure that you dont neglect your skin. · Play it safe in the sun. Use a sunblock on all of your exposed skin. · The sunblock should be broad spectrum and water resistant. · I do recommend an SPF 30 or higher sun screen any time that you plan to be in the sun for more than 20 minutes, even in the winter or on cloudy days (keep in mind that UV light penetrates clouds and can cause burns even on cloudy days).    · Apply 20 to 30 minutes before going out in the sun. Reapply sunblock every 2 hours and after swimming or sweating. [place] can also damage your skin on cool, windy days. Clouds and fog do not filter UV light. Make sure you reapply sunblock every 2 hours. · Avoid the sun in the middle of the day, between 10 AM to 4 PM. Your unprotected skin can be damaged in 15 minutes with direct sun exposure. Personal Health  · If you smoke, STOP. There are many resources available to help you successfully quit. · If you are sexually active, always practice safe sex and wear a condom. · See a dentist every 6 months. · See an eye doctor regularly. · Always wear a seat belt while in car. · Get a flu vaccine annually. · Get a tetanus booster vaccine every 10 years. Psychosocial Health  · Finally, make sure that you always have something to look forward to whether this is a vacation, a special event, or just a weekend off work. Having something to look forward to helps to maintain positive focus and is good for mental health.

## 2021-10-22 ENCOUNTER — HOSPITAL ENCOUNTER (OUTPATIENT)
Dept: PHYSICAL THERAPY | Facility: CLINIC | Age: 76
Setting detail: THERAPIES SERIES
Discharge: HOME | End: 2021-10-22
Payer: MEDICARE

## 2021-10-22 PROCEDURE — 97110 THERAPEUTIC EXERCISES: CPT

## 2021-10-22 PROCEDURE — 97016 VASOPNEUMATIC DEVICE THERAPY: CPT

## 2021-10-22 NOTE — FLOWSHEET NOTE
[] [facility]) - [facility]  [address]  [phone]  F: [phone] [] [address]  [address]   [address]  P: [phone]  F: [phone] [x] [address] &  Therapy  [address]  P: [phone]  F: [phone] [] [address]  P: [phone]  F: [phone] [] [address]  [facility]   [address]   [place]: [phone]  F: [phone]      Physical Therapy Daily Treatment    Date:  10/22/2021  Patient Name:  Pawel Davis    :  1945  MRN: [id number]  Physician: Dr Kb Lockett: medicare  Medical Diagnosis: L NAI                 Rehab Codes: Y62.982, M25.552, R26.89  Onset date: 21                 Next 's appt.: none  Visit# / total visits: ; Progress note for Medicare patient due at visit 17     Cancels/No Shows: 0/0    Subjective:    Pain:  [] Yes  [x] No Location: L hip Pain Rating: (0-10 scale) 0/10  Pain altered Tx:  [x] No  [] Yes  Action:  Comments: Pt states she was very sore after last session. Also notes she had increased soreness in her LB.      Objective:  Modalities: Game ReadyPRN  Precautions:\"no hip precautions\"  Exercises:  Exercise Reps/ Time Weight/ Level Issued for HEP Completed Comments   Bike 5'   x            Lunges 2x10  10/13 x    4 way hip 2x10 lime 10/13 x    Powerstride 2x10 8in  x    Step Ups - lateral 2x10 8\" 10/13 x    Sit to stands 2x10  10/13 x            Supine             SAQ 2x15 7 lbs 10/13 x     Single Leg Bridges  2x15   10/13 x     Clamshells 2x10  10/13 x    Hip abd 2x10  10/13 x    Yunior stretch 3x30\"  10/13 x            Seated             LAQ 2x10 7 lbs 10/13 x                                                                          Other:     Specific Instructions for next treatment:  1. Continue and progress her ROM/strength program        Treatment Charges: Mins Units   []  Modalities     [x]  Ther Exercise 35 3   []  Manual Therapy     []  Ther Activities     []  Aquatics     [x]  Vasocompression 10 1   []  Other     Total Treatment time 45 3        Assessment: [x] Progressing toward goals. Good tolerance to previously issued program. Cues to avoid excessive anterior knee movement with sit to stands and lunges, less knee pain as opposed to last session. Added powerstride to step ups to challenge stability. Ended with vaso today for reduced soreness. [] No change. [] Other:  [x] Patient would continue to benefit from skilled physical therapy services in order to: improve L hip ROM and strength so that she is more functional with the most physical activity being walking 2 miles/day     STG: (to be met in 10 treatments)  1. ? Pain: <2/10 at the worst MET  2. ? ROM: to 90% of normal limits  3. ? Strength: at least 4/5 with hip abd, ER and ext<40% limited   4. ? Function:able to walk without deviation of device  5. Patient to be independent with home exercise program as demonstrated by performance with correct form without cues. 6. Demonstrate Knowledge of fall prevention  LTG: (to be met in 20 treatments)  1. No pain in L hip  2. Full ROM of L hip  3. At least 4+/5 with all L hip movements  4. <10% interference with on LEFS  5. Able to walk 2 miles every day    Pt. Education:  [x] Yes  [] No  [x] Reviewed Prior HEP/Ed  Method of Education: [] Verbal  [x] Demo  [] Written  Comprehension of Education:  [x] Verbalizes understanding. [x] Demonstrates understanding. [] Needs review. [] Demonstrates/verbalizes HEP/Ed previously given. Plan: [x] Continue current frequency toward long and short term goals.     [] Specific Instructions for subsequent treatments:       Time In: 2:38 pm     Time Out: 3:30 pm    Electronically signed by:  Fatuma Goode, PTA

## 2021-10-22 NOTE — CARE COORDINATION
Medicare Cap   [x] Physical Therapy  [] Speech Therapy  [] Occupational therapy  *PT and Speech caps combine      $2100 Limit for PT and Speech combined  $2100 Limit for OT individually  At the beginning of the month where you expect to go over $2100, please add the [address] [place] [address] modifier      Patient Name: Pawel Davis  YOB: 1945    Note:  This is an estimate of charges billed.      Date of [address] Name # units/ charge $$$ charge Daily Total Charge Ongoing Total $$$   9/13/21 IE, bob, vaso 1,1,1  130.06 130.06   9/14/21 2 therex+ vaso  29.21+22.84+9.21 61.26 191.32   9/20 3 therex+vaso    275.42   9/30 3 therex+vaso  29.21+22.84+22.84+9.21  359.52   10/4 3 therex+vaso   84.10 443.62   10/7 3 therex+vaso   84.10 527.72   10/13 bob 3  74.898 602.61   10/14 TE 3  74.89 677.50   10/22 2 therex+ vaso   61.26 738.76

## 2021-10-29 ENCOUNTER — HOSPITAL ENCOUNTER (OUTPATIENT)
Dept: PHYSICAL THERAPY | Facility: CLINIC | Age: 76
Setting detail: THERAPIES SERIES
Discharge: HOME | End: 2021-10-29
Payer: MEDICARE

## 2021-10-29 PROCEDURE — 97110 THERAPEUTIC EXERCISES: CPT

## 2021-10-29 NOTE — CARE COORDINATION
Medicare Cap   [x] Physical Therapy  [] Speech Therapy  [] Occupational therapy  *PT and Speech caps combine      $2100 Limit for PT and Speech combined  $2100 Limit for OT individually  At the beginning of the month where you expect to go over $2100, please add the [address] [place] [address] modifier      Patient Name: Abril Vann  YOB: 1945    Note:  This is an estimate of charges billed.      Date of [address] Name # units/ charge $$$ charge Daily Total Charge Ongoing Total $$$   9/13/21 IE, bob, vaso 1,1,1  130.06 130.06   9/14/21 2 therex+ vaso  29.21+22.84+9.21 61.26 191.32   9/20 3 therex+vaso    275.42   9/30 3 therex+vaso  29.21+22.84+22.84+9.21  359.52   10/4 3 therex+vaso   84.10 443.62   10/7 3 therex+vaso   84.10 527.72   10/13 bob 3  74.898 602.61   10/14 TE 3  74.89 677.50   10/22 2 therex+ vaso   61.26 738.76   10/28 bob 4 29.21+22.84*3 97.73 836.09

## 2021-10-29 NOTE — FLOWSHEET NOTE
[] [facility]) - [facility] &  Therapy  [address]  [phone]  F: [phone] [] [address]  [employer] [address]   [address]  P: [phone]  F: [phone] [x] [address] &  Therapy  [address] [facility]  P: [phone]  F: [phone] [] [address]  P: [phone]  F: [phone] [] [address]  [facility]   [address]   [place]: [phone]  F: [phone]      Physical Therapy Daily Treatment Note    Date:  10/29/2021  Patient Name:  Rajni Peterson    :  1945  MRN: [id number]  Physician: Dr Rousseau Forth: medicare  Medical Diagnosis: L NAI                 Rehab Codes: Q66.165, M25.552, R26.89  Onset date: 21                 Next 's appt.: none  Visit# / total visits: 10/20; Progress note for Medicare patient due at visit 17     Cancels/No Shows: 0/0    Subjective:    Pain:  [] Yes  [x] No Location: L hip Pain Rating: (0-10 scale) 0/10  Pain altered Tx:  [x] No  [] Yes  Action:  Comments: Pt states she feels that her flexibility is the primary issue, but otherwise doing very well.   Rates herself at 90% back to normal.     Objective:  Modalities: Game ReadyPRN  Precautions:\"no hip precautions\"  Exercises:  Exercise Reps/ Time Weight/ Level Issued for HEP Completed Comments   Elliptical 5' L1  X    Calf/hamstring/adductor stretch 3x30\"   X    Lunges 15  10/13 X    4 way hip 2x10 lime 10/ X    Powerstride 2x10 8in  X    Step Ups - lateral 2x10 8\" 10 X    Sit to stands 20  10/ X    Hip add stretch 3x30\"  10/29 X    Supine             SAQ 2x15 7 lbs 10/13 X     Double Leg Bridges  2x15   10/13 X     Clamshells 2x10 orange 10/13 X Issued orange for HEP   Hip abd 2x15 orange 10/13 X Issued orange for HEP   Yunior stretch 3x30\"  10/13 X            Seated             LAQ 2x10 7 lbs 10/13 X                  Other:     Specific Instructions for next treatment:  1. Treatment Charges: Mins Units   []  Modalities     [x]  Ther Exercise 55 4   []  Manual Therapy     []  Ther Activities     []  Aquatics     []  Vasocompression     []  Other     Total Treatment time 55 4        Assessment: [x] Progressing toward goals. She is progressing very well. No functional deficits. Reviewed how to retrieve objects off of the ground by squatting down and not forward bending at the waist.      [] No change. [] Other:  [x] Patient would continue to benefit from skilled physical therapy services in order to: improve L hip ROM and strength so that she is more functional with the most physical activity being walking 2 miles/day     STG: (to be met in 10 treatments)  1. ? Pain: <2/10 at the worst MET  2. ? ROM: to 90% of normal limits  3. ? Strength: at least 4/5 with hip abd, ER and ext<40% limited   4. ? Function:able to walk without deviation of device  5. Patient to be independent with home exercise program as demonstrated by performance with correct form without cues. 6. Demonstrate Knowledge of fall prevention  LTG: (to be met in 20 treatments)  1. No pain in L hip  2. Full ROM of L hip  3. At least 4+/5 with all L hip movements  4. <10% interference with on LEFS  5. Able to walk 2 miles every day    Pt. Education:  [x] Yes  [] No  [x] Reviewed Prior HEP/Ed  Method of Education: [] Verbal  [x] Demo  [x] Written  Access Code: [email or web address]  URL: [email or web address]  Date: 10/29/2021  Prepared by: Michelle Jones    Exercises  Prone Quadriceps Stretch with Strap - 2 x daily - 7 x weekly - 1 sets - 3 reps - 30 hold  Standing Hamstring Stretch on Chair - 2 x daily - 7 x weekly - 1 sets - 3 reps - 30 hold  Side Lunge Adductor Stretch - 2 x daily - 7 x weekly - 1 sets - 3 reps - 30 hold  Comprehension of Education:  [x] Verbalizes understanding. [x] Demonstrates understanding. [] Needs review. [] Demonstrates/verbalizes HEP/Ed previously given. Plan: [x] Continue current frequency toward long and short term goals.     [] Specific Instructions for subsequent treatments:       Time In:  1000    Time Out: 1100    Electronically signed by:  Luis F Bustamante PT

## 2021-11-12 ENCOUNTER — HOSPITAL ENCOUNTER (OUTPATIENT)
Dept: PHYSICAL THERAPY | Facility: CLINIC | Age: 76
Setting detail: THERAPIES SERIES
Discharge: HOME | End: 2021-11-12
Payer: MEDICARE

## 2021-11-12 PROCEDURE — 97110 THERAPEUTIC EXERCISES: CPT

## 2021-11-12 NOTE — FLOWSHEET NOTE
[] [address]  - [facility]  [facility]  [address]  [phone]  F: [phone] [] [address]  [facility] [address]   [address]  P: [phone]  F: [phone] [x] [address] &  Therapy  [address]  P: [phone]  F: [phone] [] [address]  P: [phone]  F: [phone] [] [address]  [facility]   [address]   [place]: [phone]  F: [phone]      Physical Therapy Daily Treatment//discharge Note    Date:  2021  Patient Name:  Severiano Villanueva    :  1945  MRN: [id number]  Physician: Dr Jorden Wilkins: medicare  Medical Diagnosis: L NAI                 Rehab Codes: E69.529, M25.552, R26.89  Onset date: 21                 Next 's appt.: none  Visit# / total visits:    Cancels/No Shows: 0/0    Subjective:    Pain:  [] Yes  [x] No Location: L hip Pain Rating: (0-10 scale) 0/10  Pain altered Tx:  [x] No  [] Yes  Action:  Comments: Pt states she feels that her flexibility has improved. She states that she forgets that she ever had a hip replacement.   Rates herself at 98% back to normal.     Objective:  Modalities: Game ReadyPRN  Precautions:\"no hip precautions\"  Exercises:  Exercise Reps/ Time Weight/ Level Issued for HEP Completed Comments   Elliptical 5' L1      Calf/hamstring/adductor stretch 3x30\"       Lunges 15  10/13     4 way hip 2x10 lime 10/13     Powerstride 2x10 8in      Step Ups - lateral 2x10 8\" 10/13     Sit to stands 20  10/13     Hip add stretch 3x30\"  10/29     Supine            SAQ 2x15 7 lbs 10/13      Double Leg Bridges  2x15   10/13      Clamshells 2x10 orange 10/13  Issued orange for HEP   Hip abd 2x15 orange 10/13  Issued orange for HEP   Yunior stretch 3x30\"  10/13             Seated            LAQ 2x10 7 lbs 10/13                  Other:     Specific Instructions for next treatment:        Treatment Charges: Mins Units   []  Modalities     [x]  Ther Exercise 14 1   []  Manual Therapy     []  Ther Activities     []  Aquatics     []  Vasocompression     []  Other     Total Treatment time 14 1        Assessment: [x] Progressing toward goals. Her LEFI score is 63/64 and subjectively rates herself at 98% back to normal.  She expresses satisfaction with her progress and current functional ability. No further issues, concerns or questions. All goals met. Issued blue TB for hip abd and clamshells for progression at the request of the pt.     [] No change. [] Other:  [x] Patient would continue to benefit from skilled physical therapy services in order to: improve L hip ROM and strength so that she is more functional with the most physical activity being walking 2 miles/day     STG: (to be met in 10 treatments)  1. ? Pain: <2/10 at the worst MET  2. ? ROM: to 90% of normal limits  3. ? Strength: at least 4/5 with hip abd, ER and ext<40% limited   4. ? Function:able to walk without deviation of device  5. Patient to be independent with home exercise program as demonstrated by performance with correct form without cues. 6. Demonstrate Knowledge of fall prevention  LTG: (to be met in 20 treatments)  1. No pain in L hip  2. Full ROM of L hip  3. At least 4+/5 with all L hip movements  4. <10% interference with on LEFS  5. Able to walk 2 miles every day    Pt. Education:  [x] Yes  [] No  [x] Reviewed Prior HEP/Ed  Method of Education: [] Verbal  [x] Demo  [x] Written  Access Code: [email or web address]  URL: [email or web address]  Date: 10/29/2021  Prepared by: Lilly Rosas    Exercises  Prone Quadriceps Stretch with Strap - 2 x daily - 7 x weekly - 1 sets - 3 reps - 30 hold  Standing Hamstring Stretch on Chair - 2 x daily - 7 x weekly - 1 sets - 3 reps - 30 hold  Side Lunge

## 2021-11-12 NOTE — CARE COORDINATION
Medicare Cap   [x] Physical Therapy  [] Speech Therapy  [] Occupational therapy  *PT and Speech caps combine      $2100 Limit for PT and Speech combined  $2100 Limit for OT individually  At the beginning of the month where you expect to go over $2100, please add the [address] [place] [address] modifier      Patient Name: Linette Rodriguez  YOB: 1945    Note:  This is an estimate of charges billed.      Date of [address] Name # units/ charge $$$ charge Daily Total Charge Ongoing Total $$$   9/13/21 IE, bob, vaso 1,1,1  130.06 130.06   9/14/21 2 therex+ vaso  29.21+22.84+9.21 61.26 191.32   9/20 3 therex+vaso    275.42   9/30 3 therex+vaso  29.21+22.84+22.84+9.21  359.52   10/4 3 therex+vaso   84.10 443.62   10/7 3 therex+vaso   84.10 527.72   10/13 bob 3  74.898 602.61   10/14 TE 3  74.89 677.50   10/22 2 therex+ vaso   61.26 738.76   10/28 bob 4 29.21+22.84*3 97.73 836.09   11/12 bob 1 29.21 29.21 865.30

## 2022-03-24 ENCOUNTER — OFFICE VISIT (OUTPATIENT)
Dept: FAMILY MEDICINE CLINIC | Age: 77
End: 2022-03-24
Payer: MEDICARE

## 2022-03-24 VITALS
DIASTOLIC BLOOD PRESSURE: 89 MMHG | TEMPERATURE: 96.9 F | HEART RATE: 84 BPM | BODY MASS INDEX: 32.1 KG/M2 | OXYGEN SATURATION: 95 % | HEIGHT: 64 IN | SYSTOLIC BLOOD PRESSURE: 139 MMHG | RESPIRATION RATE: 16 BRPM | WEIGHT: 188 LBS

## 2022-03-24 DIAGNOSIS — Z00.00 MEDICARE ANNUAL WELLNESS VISIT, SUBSEQUENT: Primary | ICD-10-CM

## 2022-03-24 PROBLEM — R42 LIGHTHEADEDNESS: Status: RESOLVED | Noted: 2019-09-26 | Resolved: 2022-03-24

## 2022-03-24 PROBLEM — M25.552 LEFT HIP PAIN: Status: RESOLVED | Noted: 2020-01-08 | Resolved: 2022-03-24

## 2022-03-24 PROBLEM — R20.0 NUMBNESS AND TINGLING IN BOTH HANDS: Status: RESOLVED | Noted: 2019-09-26 | Resolved: 2022-03-24

## 2022-03-24 PROBLEM — R20.2 NUMBNESS AND TINGLING IN BOTH HANDS: Status: RESOLVED | Noted: 2019-09-26 | Resolved: 2022-03-24

## 2022-03-24 PROBLEM — J30.9 ALLERGIC RHINITIS: Status: RESOLVED | Noted: 2020-09-09 | Resolved: 2022-03-24

## 2022-03-24 PROCEDURE — G0439 PPPS, SUBSEQ VISIT: HCPCS | Performed by: NURSE PRACTITIONER

## 2022-03-24 ASSESSMENT — PATIENT HEALTH QUESTIONNAIRE - PHQ9
5. POOR APPETITE OR OVEREATING: 0
9. THOUGHTS THAT YOU WOULD BE BETTER OFF DEAD, OR OF HURTING YOURSELF: 0
7. TROUBLE CONCENTRATING ON THINGS, SUCH AS READING THE NEWSPAPER OR WATCHING TELEVISION: 0
SUM OF ALL RESPONSES TO PHQ9 QUESTIONS 1 & 2: 0
8. MOVING OR SPEAKING SO SLOWLY THAT OTHER PEOPLE COULD HAVE NOTICED. OR THE OPPOSITE, BEING SO FIGETY OR RESTLESS THAT YOU HAVE BEEN MOVING AROUND A LOT MORE THAN USUAL: 0
SUM OF ALL RESPONSES TO PHQ QUESTIONS 1-9: 0
4. FEELING TIRED OR HAVING LITTLE ENERGY: 0
3. TROUBLE FALLING OR STAYING ASLEEP: 0
10. IF YOU CHECKED OFF ANY PROBLEMS, HOW DIFFICULT HAVE THESE PROBLEMS MADE IT FOR YOU TO DO YOUR WORK, TAKE CARE OF THINGS AT HOME, OR GET ALONG WITH OTHER PEOPLE: 0
SUM OF ALL RESPONSES TO PHQ QUESTIONS 1-9: 0
2. FEELING DOWN, DEPRESSED OR HOPELESS: 0
6. FEELING BAD ABOUT YOURSELF - OR THAT YOU ARE A FAILURE OR HAVE LET YOURSELF OR YOUR FAMILY DOWN: 0
SUM OF ALL RESPONSES TO PHQ QUESTIONS 1-9: 0
1. LITTLE INTEREST OR PLEASURE IN DOING THINGS: 0
SUM OF ALL RESPONSES TO PHQ QUESTIONS 1-9: 0

## 2022-03-24 ASSESSMENT — LIFESTYLE VARIABLES
HOW OFTEN DO YOU HAVE A DRINK CONTAINING ALCOHOL: 2-4 TIMES A MONTH
HOW MANY STANDARD DRINKS CONTAINING ALCOHOL DO YOU HAVE ON A TYPICAL DAY: 1 OR 2

## 2022-03-24 NOTE — PROGRESS NOTES
Medicare Annual Wellness Visit    Zeenat Coronado is here for Medicare AWV    Assessment & Plan   Medicare annual wellness visit, subsequent   - Dr. Laughlin Friday left practice, will be establishing with Dr. Melanie Frazier    Recommendations for Preventive Services Due: see orders and patient instructions/AVS.  Recommended screening schedule for the next 5-10 years is provided to the patient in written form: see Patient Instructions/AVS.     Return for Medicare Annual Wellness Visit in 1 year. Subjective     Patient's complete Health Risk Assessment and screening values have been reviewed and are found in Flowsheets. The following problems were reviewed today and where indicated follow up appointments were made and/or referrals ordered.     Positive Risk Factor Screenings with Interventions:             General Health and ACP:  General  In general, how would you say your health is?: Excellent  In the past 7 days, have you experienced any of the following: New or Increased Pain, New or Increased Fatigue, Loneliness, Social Isolation, Stress or Anger?: No  Do you get the social and emotional support that you need?: Yes  Do you have a Living Will?: Yes    Advance Directives     Power of  Living Will ACP-Advance Directive ACP-Power of     Not on File Not on File Not on File Not on File      General Health Risk Interventions:  · no concerns at this standpoint    Health Habits/Nutrition:     Physical Activity: Sufficiently Active    Days of Exercise per Week: 7 days    Minutes of Exercise per Session: 40 min     Have you lost any weight without trying in the past 3 months?: No  Body mass index: (!) 32.27  Have you seen the dentist within the past year?: Yes    Health Habits/Nutrition Interventions:  · Inadequate physical activity:  patient agrees to exercise for at least 150 minutes/week, she has been doing daily exercise             Objective   Vitals:    03/24/22 1009 03/24/22 1024   BP: (!) 162/78 139/89   Pulse: 84    Resp: 16    Temp: 96.9 °F (36.1 °C)    SpO2: 95%    Weight: 188 lb (85.3 kg)    Height: 5' 4\" (1.626 m)       Body mass index is 32.27 kg/m². Allergies   Allergen Reactions    Hydrocodone-Acetaminophen Hives    Oxycodone     Sulfa Antibiotics Hives     Prior to Visit Medications    Medication Sig Taking?  Authorizing Provider   ibuprofen (ADVIL;MOTRIN) 600 MG tablet Take 1 tablet by mouth 3 times daily as needed Yes Historical Provider, MD   Multiple Vitamins-Minerals (MULTIVITAMIN ADULT PO) Take 1 tablet by mouth daily Yes Historical Provider, MD   calcium carbonate (OSCAL) 500 MG TABS tablet Take 500 mg by mouth daily Yes Historical Provider, MD   fluticasone (FLONASE) 50 MCG/ACT nasal spray 2 sprays by Nasal route daily Yes ARIEL Olivas CNP   Coenzyme Q10 (COQ10) 100 MG CAPS Take 1 capsule by mouth daily  Yes Historical Provider, MD   loratadine (CLARITIN) 10 MG tablet Take 10 mg by mouth daily as needed (Seasonal allergies)  Yes Historical Provider, MD   Biotin 1000 MCG TABS Take 1,000 mcg by mouth daily  Yes Historical Provider, MD       CareTeam (Including outside providers/suppliers regularly involved in providing care):   Patient Care Team:  ARIEL Romero CNP as PCP - General (Certified Nurse Practitioner)  ARIEL Romero CNP as PCP - Family Medicine (Certified Nurse Practitioner)  ARIEL Romero CNP as PCP - [facility] Empaneled Provider  Kristin Kussmaul, MD as Consulting Physician (Gastroenterology)    Reviewed and updated this visit:  Tobacco  Allergies  Meds  Problems  Med Hx  Surg Hx  Soc Hx  Fam Hx

## 2022-03-24 NOTE — PATIENT INSTRUCTIONS
Personalized Preventive Plan for Aiden Carlos - 3/24/2022  Medicare offers a range of preventive health benefits. Some of the tests and screenings are paid in full while other may be subject to a deductible, co-insurance, and/or copay. Some of these benefits include a comprehensive review of your medical history including lifestyle, illnesses that may run in your family, and various assessments and screenings as appropriate. After reviewing your medical record and screening and assessments performed today your provider may have ordered immunizations, labs, imaging, and/or referrals for you. A list of these orders (if applicable) as well as your Preventive Care list are included within your After Visit Summary for your review. Other Preventive Recommendations:    · A preventive eye exam performed by an eye specialist is recommended every 1-2 years to screen for glaucoma; cataracts, macular degeneration, and other eye disorders. · A preventive dental visit is recommended every 6 months. · Try to get at least 150 minutes of exercise per week or 10,000 steps per day on a pedometer . · Order or download the FREE \"Exercise & Physical Activity: Your Everyday Guide\" from [employer]. Call 1-567.853.7816 or search [employer] online. · You need 9751-8389 mg of calcium and 6805-0280 IU of vitamin D per day. It is possible to meet your calcium requirement with diet alone, but a vitamin D supplement is usually necessary to meet this goal.  · When exposed to the sun, use a sunscreen that protects against both UVA and UVB radiation with an SPF of 30 or greater. Reapply every 2 to 3 hours or after sweating, drying off with a towel, or swimming. · Always wear a seat belt when traveling in a car. Always wear a helmet when riding a bicycle or motorcycle.

## 2023-01-18 ENCOUNTER — OFFICE VISIT (OUTPATIENT)
Dept: FAMILY MEDICINE CLINIC | Age: 78
End: 2023-01-18

## 2023-01-18 ENCOUNTER — TELEPHONE (OUTPATIENT)
Dept: FAMILY MEDICINE CLINIC | Age: 78
End: 2023-01-18

## 2023-01-18 VITALS
OXYGEN SATURATION: 98 % | TEMPERATURE: 97.3 F | BODY MASS INDEX: 32.1 KG/M2 | DIASTOLIC BLOOD PRESSURE: 80 MMHG | HEART RATE: 71 BPM | WEIGHT: 187 LBS | RESPIRATION RATE: 16 BRPM | SYSTOLIC BLOOD PRESSURE: 162 MMHG

## 2023-01-18 DIAGNOSIS — E78.5 HYPERLIPIDEMIA, UNSPECIFIED HYPERLIPIDEMIA TYPE: ICD-10-CM

## 2023-01-18 DIAGNOSIS — Z00.00 PREVENTATIVE HEALTH CARE: Primary | ICD-10-CM

## 2023-01-18 DIAGNOSIS — E55.9 VITAMIN D DEFICIENCY: ICD-10-CM

## 2023-01-18 DIAGNOSIS — I10 ESSENTIAL HYPERTENSION: ICD-10-CM

## 2023-01-18 PROBLEM — Z80.49 FAMILY HISTORY OF MALIGNANT NEOPLASM OF UTERUS: Status: RESOLVED | Noted: 2020-09-09 | Resolved: 2023-01-18

## 2023-01-18 PROBLEM — Z80.49 FAMILY HISTORY OF MALIGNANT NEOPLASM OF ENDOMETRIUM: Status: RESOLVED | Noted: 2020-01-08 | Resolved: 2023-01-18

## 2023-01-18 PROBLEM — Z96.649 STATUS POST HIP REPLACEMENT: Status: RESOLVED | Noted: 2021-04-21 | Resolved: 2023-01-18

## 2023-01-18 PROBLEM — R05.3 PERSISTENT COUGH: Status: RESOLVED | Noted: 2020-09-07 | Resolved: 2023-01-18

## 2023-01-18 PROBLEM — M65.30 TRIGGER FINGER OF RIGHT HAND: Status: RESOLVED | Noted: 2019-09-26 | Resolved: 2023-01-18

## 2023-01-18 PROBLEM — N73.6 FEMALE PELVIC PERITONEAL ADHESIONS: Status: RESOLVED | Noted: 2020-12-18 | Resolved: 2023-01-18

## 2023-01-18 PROBLEM — N85.4 RETROVERTED UTERUS: Status: RESOLVED | Noted: 2020-09-09 | Resolved: 2023-01-18

## 2023-01-18 PROBLEM — N95.2 ATROPHY OF VAGINA: Status: RESOLVED | Noted: 2020-09-09 | Resolved: 2023-01-18

## 2023-01-18 PROBLEM — Z80.0 FAMILY HISTORY OF LYNCH SYNDROME: Status: RESOLVED | Noted: 2020-12-18 | Resolved: 2023-01-18

## 2023-01-18 PROBLEM — N94.9 DISORDER OF FEMALE GENITAL ORGAN: Status: RESOLVED | Noted: 2020-09-09 | Resolved: 2023-01-18

## 2023-01-18 RX ORDER — HYDROCHLOROTHIAZIDE 12.5 MG/1
12.5 CAPSULE, GELATIN COATED ORAL EVERY MORNING
Qty: 30 CAPSULE | Refills: 1 | Status: SHIPPED | OUTPATIENT
Start: 2023-01-18

## 2023-01-18 SDOH — ECONOMIC STABILITY: FOOD INSECURITY: WITHIN THE PAST 12 MONTHS, YOU WORRIED THAT YOUR FOOD WOULD RUN OUT BEFORE YOU GOT MONEY TO BUY MORE.: NEVER TRUE

## 2023-01-18 SDOH — ECONOMIC STABILITY: FOOD INSECURITY: WITHIN THE PAST 12 MONTHS, THE FOOD YOU BOUGHT JUST DIDN'T LAST AND YOU DIDN'T HAVE MONEY TO GET MORE.: NEVER TRUE

## 2023-01-18 ASSESSMENT — SOCIAL DETERMINANTS OF HEALTH (SDOH): HOW HARD IS IT FOR YOU TO PAY FOR THE VERY BASICS LIKE FOOD, HOUSING, MEDICAL CARE, AND HEATING?: NOT HARD AT ALL

## 2023-01-18 ASSESSMENT — PATIENT HEALTH QUESTIONNAIRE - PHQ9
SUM OF ALL RESPONSES TO PHQ QUESTIONS 1-9: 0
2. FEELING DOWN, DEPRESSED OR HOPELESS: 0
SUM OF ALL RESPONSES TO PHQ QUESTIONS 1-9: 0
1. LITTLE INTEREST OR PLEASURE IN DOING THINGS: 0
SUM OF ALL RESPONSES TO PHQ QUESTIONS 1-9: 0
SUM OF ALL RESPONSES TO PHQ9 QUESTIONS 1 & 2: 0
SUM OF ALL RESPONSES TO PHQ QUESTIONS 1-9: 0

## 2023-01-18 NOTE — TELEPHONE ENCOUNTER
Pt had high BP reading at dentist  179/82 and at home 179/91 had nurse at [facility] facility last week recommended her get it checked patient scheduled appt for this Friday- states has no symptoms

## 2023-01-18 NOTE — PROGRESS NOTES
Ortega Mack, APRN-[facility]  [address] [address], [address]  [facility] [address]  Dept: [phone]  Dept Fax: [phone]    Subjective:   Summer Hansen is a 65 y.o. female with hypertension. Current Outpatient Medications   Medication Sig Dispense Refill    ibuprofen (ADVIL;MOTRIN) 600 MG tablet Take 1 tablet by mouth 3 times daily as needed      Multiple Vitamins-Minerals (MULTIVITAMIN ADULT PO) Take 1 tablet by mouth daily      calcium carbonate (OSCAL) 500 MG TABS tablet Take 500 mg by mouth daily      fluticasone (FLONASE) 50 MCG/ACT nasal spray 2 sprays by Nasal route daily 1 Bottle 0    Coenzyme Q10 (COQ10) 100 MG CAPS Take 1 capsule by mouth daily       loratadine (CLARITIN) 10 MG tablet Take 10 mg by mouth daily as needed (Seasonal allergies)       Biotin 1000 MCG TABS Take 1,000 mcg by mouth daily        No current facility-administered medications for this visit. Hypertension ROS: no chest pain on exertion, no dyspnea on exertion, no swelling of ankles, she is having headaches. New concerns: a week ago she was at an appt and it was elevated at 140/82 an then went to dentist yesterday for tooth extraction and her BP was 178/83, she checked it this morning and her BP was 168/87. She has been on amoxicillin for a week and she had a crack in the root and had a bone implant. Pain for tooth is 3 on 0-10 scale. Objective:   BP (!) 162/84   Pulse 71   Temp 97.3 °F (36.3 °C)   Resp 16   Wt 187 lb (84.8 kg)   SpO2 98%   BMI 32.10 kg/m²    Appearance alert, well appearing, and in no distress and oriented to person, place, and time. General exam BP noted to be well controlled today in office, S1, S2 normal, no gallop, no murmur, chest clear, no JVD, no HSM, no edema. Lab review: orders written for new lab studies as appropriate; see orders. Assessment:    Hypertension loss of control due to intercurrent illness.      Plan:   - will start hydrochlorothiazide daily and recheck in 1 month. - Education provided on maintaining a low sodium diet and routine exercise.   - Advised to seek emergent tx if SBP>180 and/or DBP>110, any chest pain, h/a or vision changes    ARIEL Ignacio-CNP

## 2023-01-25 ENCOUNTER — OFFICE VISIT (OUTPATIENT)
Dept: FAMILY MEDICINE CLINIC | Age: 78
End: 2023-01-25

## 2023-01-25 VITALS
SYSTOLIC BLOOD PRESSURE: 148 MMHG | HEART RATE: 75 BPM | DIASTOLIC BLOOD PRESSURE: 78 MMHG | TEMPERATURE: 96.8 F | RESPIRATION RATE: 16 BRPM | BODY MASS INDEX: 32.1 KG/M2 | WEIGHT: 187 LBS | OXYGEN SATURATION: 98 %

## 2023-01-25 DIAGNOSIS — R06.02 SOB (SHORTNESS OF BREATH): ICD-10-CM

## 2023-01-25 DIAGNOSIS — I10 ESSENTIAL HYPERTENSION: ICD-10-CM

## 2023-01-25 DIAGNOSIS — I51.7 LVH (LEFT VENTRICULAR HYPERTROPHY): Primary | ICD-10-CM

## 2023-01-25 NOTE — PROGRESS NOTES
Sai Triana, APRN-[facility]  [address] [address], [address]  [facility] [address]  Dept: [phone]  Dept Fax: [phone]    Subjective:   Brii Monson is a 65 y.o. female with hypertension. Current Outpatient Medications   Medication Sig Dispense Refill    hydroCHLOROthiazide (MICROZIDE) 12.5 MG capsule Take 1 capsule by mouth every morning 30 capsule 1    ibuprofen (ADVIL;MOTRIN) 600 MG tablet Take 1 tablet by mouth 3 times daily as needed      Multiple Vitamins-Minerals (MULTIVITAMIN ADULT PO) Take 1 tablet by mouth daily      calcium carbonate (OSCAL) 500 MG TABS tablet Take 500 mg by mouth daily      fluticasone (FLONASE) 50 MCG/ACT nasal spray 2 sprays by Nasal route daily 1 Bottle 0    Coenzyme Q10 (COQ10) 100 MG CAPS Take 1 capsule by mouth daily       loratadine (CLARITIN) 10 MG tablet Take 10 mg by mouth daily as needed (Seasonal allergies)       Biotin 1000 MCG TABS Take 1,000 mcg by mouth daily        No current facility-administered medications for this visit. Hypertension ROS: not taking medications regularly as instructed, home BP monitoring, no TIA's, no chest pain on exertion, no swelling of ankles, noting swelling of ankles. New concerns: took it Tuesday- Thursday and noticed that she felt SOB at times, so she decided to stop the medication she noticed it happened when her BP was 119/77 and 117/76 . Denies any chest pain but does get SOB at times with stairs. Objective:   BP (!) 144/78   Pulse 75   Temp 96.8 °F (36 °C)   Resp 16   Wt 187 lb (84.8 kg)   SpO2 98%   BMI 32.10 kg/m²    Appearance alert, well appearing, and in no distress and oriented to person, place, and time. General exam BP noted to be well controlled today in office, S1, S2 normal, no gallop, no murmur, chest clear, no JVD, no HSM, no edema. Lab review: no lab studies available for review at time of visit. Assessment:    Hypertension improved.      Plan: LVH (left ventricular hypertrophy)   SOB (shortness of breath)  Will get echo to further evaluate. - ECHO Complete 2D W Doppler W Color; Future    Essential hypertension  - Continue HCTZ and take daily. - continue to monitor BP at home and call if BP runs above 135/85.  - Education provided on maintaining a low sodium diet and routine exercise.   - Advised to seek emergent tx if SBP>180 and/or DBP>110, any chest pain, h/a or vision changes    Adrian Cervantes, APRN-CNP

## 2023-02-14 DIAGNOSIS — I10 ESSENTIAL HYPERTENSION: ICD-10-CM

## 2023-02-14 RX ORDER — HYDROCHLOROTHIAZIDE 12.5 MG/1
12.5 CAPSULE, GELATIN COATED ORAL EVERY MORNING
Qty: 30 CAPSULE | Refills: 1 | Status: SHIPPED | OUTPATIENT
Start: 2023-02-14

## 2023-10-17 ENCOUNTER — TELEPHONE (OUTPATIENT)
Dept: FAMILY MEDICINE CLINIC | Age: 78
End: 2023-10-17

## 2023-10-17 NOTE — TELEPHONE ENCOUNTER
PT was hoping to be seen as soon as possible     PT has been coughing \"really really hard\" for a month and is beginning to wheeze at night and is barely able to breath, and its even giving her a headache when she gets into a coughing fit, which happens several times an hour.

## 2023-10-17 NOTE — TELEPHONE ENCOUNTER
She hasn't been seen since January and will need to assess lungs, please offer appt for tomorrow morning at 8:30

## 2023-10-18 ENCOUNTER — OFFICE VISIT (OUTPATIENT)
Dept: FAMILY MEDICINE CLINIC | Age: 78
End: 2023-10-18
Payer: MEDICARE

## 2023-10-18 VITALS
BODY MASS INDEX: 32.44 KG/M2 | RESPIRATION RATE: 16 BRPM | DIASTOLIC BLOOD PRESSURE: 76 MMHG | HEART RATE: 85 BPM | OXYGEN SATURATION: 98 % | WEIGHT: 189 LBS | SYSTOLIC BLOOD PRESSURE: 122 MMHG | TEMPERATURE: 97.2 F

## 2023-10-18 DIAGNOSIS — J20.9 ACUTE BRONCHITIS, UNSPECIFIED ORGANISM: Primary | ICD-10-CM

## 2023-10-18 PROCEDURE — 3074F SYST BP LT 130 MM HG: CPT | Performed by: NURSE PRACTITIONER

## 2023-10-18 PROCEDURE — 1123F ACP DISCUSS/DSCN MKR DOCD: CPT | Performed by: NURSE PRACTITIONER

## 2023-10-18 PROCEDURE — 99213 OFFICE O/P EST LOW 20 MIN: CPT | Performed by: NURSE PRACTITIONER

## 2023-10-18 PROCEDURE — 3078F DIAST BP <80 MM HG: CPT | Performed by: NURSE PRACTITIONER

## 2023-10-18 RX ORDER — AZITHROMYCIN 250 MG/1
250 TABLET, FILM COATED ORAL SEE ADMIN INSTRUCTIONS
Qty: 6 TABLET | Refills: 0 | Status: SHIPPED | OUTPATIENT
Start: 2023-10-18 | End: 2023-10-23

## 2023-10-18 RX ORDER — PREDNISONE 20 MG/1
TABLET ORAL
Qty: 7 TABLET | Refills: 0 | Status: SHIPPED | OUTPATIENT
Start: 2023-10-18

## 2023-10-18 RX ORDER — MONTELUKAST SODIUM 10 MG/1
10 TABLET ORAL NIGHTLY
Qty: 30 TABLET | Refills: 0 | Status: SHIPPED | OUTPATIENT
Start: 2023-10-18 | End: 2023-11-17

## 2023-10-18 SDOH — ECONOMIC STABILITY: FOOD INSECURITY: WITHIN THE PAST 12 MONTHS, YOU WORRIED THAT YOUR FOOD WOULD RUN OUT BEFORE YOU GOT MONEY TO BUY MORE.: NEVER TRUE

## 2023-10-18 SDOH — ECONOMIC STABILITY: FOOD INSECURITY: WITHIN THE PAST 12 MONTHS, THE FOOD YOU BOUGHT JUST DIDN'T LAST AND YOU DIDN'T HAVE MONEY TO GET MORE.: NEVER TRUE

## 2023-10-18 SDOH — ECONOMIC STABILITY: INCOME INSECURITY: HOW HARD IS IT FOR YOU TO PAY FOR THE VERY BASICS LIKE FOOD, HOUSING, MEDICAL CARE, AND HEATING?: NOT HARD AT ALL

## 2023-10-18 SDOH — ECONOMIC STABILITY: HOUSING INSECURITY
IN THE LAST 12 MONTHS, WAS THERE A TIME WHEN YOU DID NOT HAVE A STEADY PLACE TO SLEEP OR SLEPT IN A SHELTER (INCLUDING NOW)?: NO

## 2023-10-18 ASSESSMENT — ENCOUNTER SYMPTOMS
RHINORRHEA: 1
SINUS PRESSURE: 1
COLOR CHANGE: 0
ABDOMINAL DISTENTION: 0
BACK PAIN: 0
ABDOMINAL PAIN: 0
DIARRHEA: 0
WHEEZING: 1
SINUS PAIN: 1
CONSTIPATION: 0
SORE THROAT: 0
SHORTNESS OF BREATH: 1
CHEST TIGHTNESS: 0
NAUSEA: 0
COUGH: 1

## 2023-10-18 NOTE — PROGRESS NOTES
sounds: Normal heart sounds. No murmur heard. Pulmonary:      Effort: Pulmonary effort is normal. No tachypnea, bradypnea, accessory muscle usage, prolonged expiration, respiratory distress or retractions. She is not intubated. Breath sounds: Normal breath sounds. No stridor, decreased air movement or transmitted upper airway sounds. No decreased breath sounds, wheezing, rhonchi or rales. Comments: Harsh dry cough   Abdominal:      General: Bowel sounds are normal. There is no distension. Palpations: Abdomen is soft. Tenderness: There is no abdominal tenderness. Musculoskeletal:         General: Normal range of motion. Cervical back: Normal range of motion. Right lower leg: No edema. Left lower leg: No edema. Lymphadenopathy:      Cervical: No cervical adenopathy. Skin:     General: Skin is warm and dry. Neurological:      General: No focal deficit present. Mental Status: She is alert and oriented to person, place, and time. Deep Tendon Reflexes: Reflexes normal.   Psychiatric:         Mood and Affect: Mood normal.         Behavior: Behavior normal. Behavior is cooperative. Assessment:      Diagnosis Orders   1. Acute bronchitis, unspecified organism  azithromycin (ZITHROMAX) 250 MG tablet    predniSONE (DELTASONE) 20 MG tablet    montelukast (SINGULAIR) 10 MG tablet        Plan:     Acute bronchitis, unspecified organism  -start azithromycin and prednisone as prescribed. - discussed singulair at night for 30 days. - cough and deep breath. - rest and increase fluids. - To help relieve your symptoms, I suggest the following over-the-counter treatments:    For fevers or pain: acetaminophen (Tylenol) or ibuprofen (Advil, Motrin) or naproxen (Aleve)  For dry cough: medications containing dextromethorphan, such as Delsym, Robitussin DM or Mucinex DM and medicated throat lozenges  For congestion or sinus pressure: medications containing guaifenesin to help

## 2023-11-01 ENCOUNTER — OFFICE VISIT (OUTPATIENT)
Dept: FAMILY MEDICINE CLINIC | Age: 78
End: 2023-11-01

## 2023-11-01 VITALS
WEIGHT: 189 LBS | DIASTOLIC BLOOD PRESSURE: 84 MMHG | BODY MASS INDEX: 32.27 KG/M2 | SYSTOLIC BLOOD PRESSURE: 136 MMHG | HEIGHT: 64 IN | RESPIRATION RATE: 16 BRPM | TEMPERATURE: 97 F | HEART RATE: 93 BPM | OXYGEN SATURATION: 96 %

## 2023-11-01 DIAGNOSIS — I10 ESSENTIAL HYPERTENSION: ICD-10-CM

## 2023-11-01 DIAGNOSIS — J45.909 ASTHMA DUE TO SEASONAL ALLERGIES: ICD-10-CM

## 2023-11-01 DIAGNOSIS — M81.0 AGE-RELATED OSTEOPOROSIS WITHOUT CURRENT PATHOLOGICAL FRACTURE: ICD-10-CM

## 2023-11-01 DIAGNOSIS — Z00.00 MEDICARE ANNUAL WELLNESS VISIT, SUBSEQUENT: Primary | ICD-10-CM

## 2023-11-01 DIAGNOSIS — E55.9 VITAMIN D DEFICIENCY: ICD-10-CM

## 2023-11-01 DIAGNOSIS — I51.7 LVH (LEFT VENTRICULAR HYPERTROPHY): ICD-10-CM

## 2023-11-01 DIAGNOSIS — E78.5 HYPERLIPIDEMIA, UNSPECIFIED HYPERLIPIDEMIA TYPE: ICD-10-CM

## 2023-11-01 ASSESSMENT — PATIENT HEALTH QUESTIONNAIRE - PHQ9
SUM OF ALL RESPONSES TO PHQ QUESTIONS 1-9: 0
SUM OF ALL RESPONSES TO PHQ QUESTIONS 1-9: 0
1. LITTLE INTEREST OR PLEASURE IN DOING THINGS: 0
SUM OF ALL RESPONSES TO PHQ QUESTIONS 1-9: 0
SUM OF ALL RESPONSES TO PHQ QUESTIONS 1-9: 0
2. FEELING DOWN, DEPRESSED OR HOPELESS: 0
SUM OF ALL RESPONSES TO PHQ9 QUESTIONS 1 & 2: 0

## 2023-11-01 ASSESSMENT — LIFESTYLE VARIABLES
HOW MANY STANDARD DRINKS CONTAINING ALCOHOL DO YOU HAVE ON A TYPICAL DAY: 1 OR 2
HOW OFTEN DO YOU HAVE A DRINK CONTAINING ALCOHOL: MONTHLY OR LESS

## 2023-11-01 NOTE — PROGRESS NOTES
Medicare Annual Wellness Visit    Fritz Burrell is here for Medicare AWV    Assessment & Plan   Medicare annual wellness visit, subsequent    Essential hypertension  LVH (left ventricular hypertrophy)  - Stable: Medication re-filled as needed, con't medications as prescribed, con't current tx plan  - Continue HCTZ as previously prescribed  - Education provided on maintaining a low sodium diet and routine exercise. - Advised to seek emergent tx if SBP>180 and/or DBP>110, any chest pain, h/a or vision changes    Vitamin D deficiency  - due for labs, continue to increase vitamin D intake. Hyperlipidemia, unspecified hyperlipidemia type  - Stable: Medication re-filled as needed, con't medications as prescribed, con't current tx plan  - Lifestyle changes: Decrease fats, sugars, carbohydrates, and increase routine exercise, try to get 150 minutes of aerobic activity a week and watch calorie portions and to limit her carbs in her diet. - Foods that helps increase HDL include the following: Fish, nuts, flax, avocados, and legumes (such as soy, kidney beans, chickpeas). - Will cont with low fat/chol diet     Age-related osteoporosis without current pathological fracture  - will get past dexa scan, did discuss with pt she is most likely due at this time. Pt verbalized understanding. Asthma due to seasonal allergies  - continue Singulair as prescribed. Recommendations for Preventive Services Due: see orders and patient instructions/AVS.  Recommended screening schedule for the next 5-10 years is provided to the patient in written form: see Patient Instructions/AVS.     Return in about 6 months (around 5/1/2024) for 6 month f/u . Subjective   The following acute and/or chronic problems were also addressed today:  Still having a cough occasional that has been lingering. She is feeling better and currently taking the Singulair as prescribed.      Patient's complete Health Risk Assessment and screening values

## 2023-11-02 ENCOUNTER — HOSPITAL ENCOUNTER (OUTPATIENT)
Age: 78
Discharge: HOME | End: 2023-11-02
Payer: MEDICARE

## 2023-11-02 DIAGNOSIS — E55.9 VITAMIN D DEFICIENCY: ICD-10-CM

## 2023-11-02 DIAGNOSIS — E78.5 HYPERLIPIDEMIA, UNSPECIFIED HYPERLIPIDEMIA TYPE: ICD-10-CM

## 2023-11-02 DIAGNOSIS — E55.9 VITAMIN D DEFICIENCY: Primary | ICD-10-CM

## 2023-11-02 DIAGNOSIS — Z00.00 PREVENTATIVE HEALTH CARE: ICD-10-CM

## 2023-11-02 LAB
25(OH)D3 SERPL-MCNC: 26.9 NG/ML (ref 30–100)
ALBUMIN SERPL-MCNC: 4.1 G/DL (ref 3.5–5.2)
ALBUMIN/GLOB SERPL: 1 {RATIO} (ref 1–2.5)
ALP SERPL-CCNC: 87 U/L (ref 35–104)
ALT SERPL-CCNC: 18 U/L (ref 10–35)
ANION GAP SERPL CALCULATED.3IONS-SCNC: 10 MMOL/L (ref 9–16)
AST SERPL-CCNC: 21 U/L (ref 10–35)
BILIRUB SERPL-MCNC: 0.6 MG/DL (ref 0–1.2)
BUN SERPL-MCNC: 12 MG/DL (ref 8–23)
CALCIUM SERPL-MCNC: 9.1 MG/DL (ref 8.6–10.4)
CHLORIDE SERPL-SCNC: 107 MMOL/L (ref 98–107)
CHOLEST SERPL-MCNC: 232 MG/DL (ref 0–199)
CHOLESTEROL/HDL RATIO: 4
CO2 SERPL-SCNC: 26 MMOL/L (ref 20–31)
CREAT SERPL-MCNC: 0.7 MG/DL (ref 0.5–0.9)
ERYTHROCYTE [DISTWIDTH] IN BLOOD BY AUTOMATED COUNT: 14.6 % (ref 11.8–14.4)
EST. AVERAGE GLUCOSE BLD GHB EST-MCNC: 114 MG/DL
GFR SERPL CREATININE-BSD FRML MDRD: >60 ML/MIN/1.73M2
GLUCOSE SERPL-MCNC: 99 MG/DL (ref 74–99)
HBA1C MFR BLD: 5.6 % (ref 4–6)
HCT VFR BLD AUTO: 41.7 % (ref 36.3–47.1)
HDLC SERPL-MCNC: 64 MG/DL
HGB BLD-MCNC: 13.6 G/DL (ref 11.9–15.1)
LDLC SERPL CALC-MCNC: 144 MG/DL (ref 0–100)
MCH RBC QN AUTO: 29.1 PG (ref 25.2–33.5)
MCHC RBC AUTO-ENTMCNC: 32.6 G/DL (ref 28.4–34.8)
MCV RBC AUTO: 89.3 FL (ref 82.6–102.9)
NRBC BLD-RTO: 0 PER 100 WBC
PLATELET # BLD AUTO: 264 K/UL (ref 138–453)
PMV BLD AUTO: 9.8 FL (ref 8.1–13.5)
POTASSIUM SERPL-SCNC: 4.2 MMOL/L (ref 3.7–5.3)
PROT SERPL-MCNC: 6.9 G/DL (ref 6.6–8.7)
RBC # BLD AUTO: 4.67 M/UL (ref 3.95–5.11)
SODIUM SERPL-SCNC: 143 MMOL/L (ref 136–145)
TRIGL SERPL-MCNC: 118 MG/DL (ref 0–149)
TSH SERPL DL<=0.05 MIU/L-ACNC: 1.37 UIU/ML (ref 0.27–4.2)
VLDLC SERPL CALC-MCNC: 24 MG/DL
WBC OTHER # BLD: 3.6 K/UL (ref 3.5–11.3)

## 2023-11-02 PROCEDURE — 80061 LIPID PANEL: CPT

## 2023-11-02 PROCEDURE — 36415 COLL VENOUS BLD VENIPUNCTURE: CPT

## 2023-11-02 PROCEDURE — 80053 COMPREHEN METABOLIC PANEL: CPT

## 2023-11-02 PROCEDURE — 82306 VITAMIN D 25 HYDROXY: CPT

## 2023-11-02 PROCEDURE — 83036 HEMOGLOBIN GLYCOSYLATED A1C: CPT

## 2023-11-02 PROCEDURE — 84443 ASSAY THYROID STIM HORMONE: CPT

## 2023-11-02 PROCEDURE — 85027 COMPLETE CBC AUTOMATED: CPT

## 2023-11-02 RX ORDER — ERGOCALCIFEROL 1.25 MG/1
50000 CAPSULE ORAL WEEKLY
Qty: 4 CAPSULE | Refills: 3 | Status: SHIPPED | OUTPATIENT
Start: 2023-11-02

## 2024-04-25 ENCOUNTER — OFFICE VISIT (OUTPATIENT)
Dept: OBGYN CLINIC | Age: 79
End: 2024-04-25
Payer: MEDICARE

## 2024-04-25 ENCOUNTER — HOSPITAL ENCOUNTER (OUTPATIENT)
Age: 79
Setting detail: SPECIMEN
Discharge: HOME | End: 2024-04-25

## 2024-04-25 VITALS
DIASTOLIC BLOOD PRESSURE: 70 MMHG | SYSTOLIC BLOOD PRESSURE: 122 MMHG | HEIGHT: 64 IN | BODY MASS INDEX: 32.27 KG/M2 | WEIGHT: 189 LBS

## 2024-04-25 DIAGNOSIS — M85.80 OSTEOPENIA, UNSPECIFIED LOCATION: ICD-10-CM

## 2024-04-25 DIAGNOSIS — Z12.31 SCREENING MAMMOGRAM, ENCOUNTER FOR: ICD-10-CM

## 2024-04-25 DIAGNOSIS — N95.2 VAGINAL ATROPHY: ICD-10-CM

## 2024-04-25 DIAGNOSIS — Z91.89 ENCOUNTER FOR GYNECOLOGIC EXAMINATION FOR HIGH-RISK PATIENT COVERED BY MEDICARE: Primary | ICD-10-CM

## 2024-04-25 PROBLEM — Z96.1 PSEUDOPHAKIA OF BOTH EYES: Status: ACTIVE | Noted: 2024-01-05

## 2024-04-25 PROBLEM — H43.811 DEGENERATION OF POSTERIOR VITREOUS BODY OF RIGHT EYE: Status: ACTIVE | Noted: 2024-01-05

## 2024-04-25 PROBLEM — K44.9 HIATAL HERNIA: Status: RESOLVED | Noted: 2020-12-18 | Resolved: 2024-04-25

## 2024-04-25 PROBLEM — H52.4 PRESBYOPIA: Status: ACTIVE | Noted: 2024-01-05

## 2024-04-25 PROBLEM — E78.5 HYPERLIPIDEMIA: Status: RESOLVED | Noted: 2020-01-08 | Resolved: 2024-04-25

## 2024-04-25 PROBLEM — H04.19 DISORDER OF LACRIMAL GLAND: Status: ACTIVE | Noted: 2024-01-05

## 2024-04-25 PROBLEM — H40.009 GLAUCOMA SUSPECT: Status: ACTIVE | Noted: 2024-01-05

## 2024-04-25 PROCEDURE — 3074F SYST BP LT 130 MM HG: CPT | Performed by: OBSTETRICS & GYNECOLOGY

## 2024-04-25 PROCEDURE — 3078F DIAST BP <80 MM HG: CPT | Performed by: OBSTETRICS & GYNECOLOGY

## 2024-04-25 PROCEDURE — 99387 INIT PM E/M NEW PAT 65+ YRS: CPT | Performed by: OBSTETRICS & GYNECOLOGY

## 2024-04-25 NOTE — PROGRESS NOTES
Violence: Not on file   Housing Stability: Unknown (10/18/2023)    Housing Stability Vital Sign     Unable to Pay for Housing in the Last Year: Not on file     Number of Places Lived in the Last Year: Not on file     Unstable Housing in the Last Year: No       MEDICATIONS:  Current Outpatient Medications   Medication Sig Dispense Refill    vitamin D (ERGOCALCIFEROL) 1.25 MG (57609 UT) CAPS capsule Take 1 capsule by mouth once a week 4 capsule 3    montelukast (SINGULAIR) 10 MG tablet Take 1 tablet by mouth nightly 30 tablet 0    ibuprofen (ADVIL;MOTRIN) 600 MG tablet Take 1 tablet by mouth 3 times daily as needed      Multiple Vitamins-Minerals (MULTIVITAMIN ADULT PO) Take 1 tablet by mouth daily      calcium carbonate (OSCAL) 500 MG TABS tablet Take 1 tablet by mouth daily      fluticasone (FLONASE) 50 MCG/ACT nasal spray 2 sprays by Nasal route daily 1 Bottle 0    Coenzyme Q10 (COQ10) 100 MG CAPS Take 1 capsule by mouth daily       loratadine (CLARITIN) 10 MG tablet Take 1 tablet by mouth daily as needed (Seasonal allergies)      Biotin 1000 MCG TABS Take 1,000 mcg by mouth daily        No current facility-administered medications for this visit.           ALLERGIES:  Allergies as of 04/25/2024 - Fully Reviewed 04/25/2024   Allergen Reaction Noted    Hydrocodone-acetaminophen Hives 12/16/2020    Oxycodone  11/03/2017    Sulfa antibiotics Hives 12/16/2020     Symptoms of decreased mood absent    Immunization status: stated as current, but no records available.    Gynecologic History:     No LMP recorded. Patient has had a hysterectomy.    Sexually Active: No    STD History: No     Permanent Sterilization: Yes postmenopausal/hysterectomy   Reversible Birth Control: No        Hormone Replacement Exposure: No      Genetic Qualified Family History of Breast, Ovarian , Colon or Uterine Cancer: Yes sisters x2 with endometrial cancer.      If YES see scanned worksheet.    Preventative Health Testing:    Health

## 2024-04-27 LAB
HPV I/H RISK 4 DNA CVX QL NAA+PROBE: NOT DETECTED
HPV SAMPLE: NORMAL
HPV, INTERPRETATION: NORMAL
HPV16 DNA CVX QL NAA+PROBE: NOT DETECTED
HPV18 DNA CVX QL NAA+PROBE: NOT DETECTED
SPECIMEN DESCRIPTION: NORMAL

## 2024-04-30 ASSESSMENT — PATIENT HEALTH QUESTIONNAIRE - PHQ9
2. FEELING DOWN, DEPRESSED OR HOPELESS: NOT AT ALL
1. LITTLE INTEREST OR PLEASURE IN DOING THINGS: NOT AT ALL
SUM OF ALL RESPONSES TO PHQ9 QUESTIONS 1 & 2: 0
SUM OF ALL RESPONSES TO PHQ QUESTIONS 1-9: 0
2. FEELING DOWN, DEPRESSED OR HOPELESS: NOT AT ALL
SUM OF ALL RESPONSES TO PHQ QUESTIONS 1-9: 0
SUM OF ALL RESPONSES TO PHQ QUESTIONS 1-9: 0
SUM OF ALL RESPONSES TO PHQ9 QUESTIONS 1 & 2: 0
SUM OF ALL RESPONSES TO PHQ QUESTIONS 1-9: 0
1. LITTLE INTEREST OR PLEASURE IN DOING THINGS: NOT AT ALL

## 2024-05-01 ENCOUNTER — OFFICE VISIT (OUTPATIENT)
Dept: FAMILY MEDICINE CLINIC | Age: 79
End: 2024-05-01

## 2024-05-01 VITALS
OXYGEN SATURATION: 96 % | DIASTOLIC BLOOD PRESSURE: 84 MMHG | SYSTOLIC BLOOD PRESSURE: 130 MMHG | WEIGHT: 189 LBS | RESPIRATION RATE: 16 BRPM | TEMPERATURE: 97.4 F | HEART RATE: 94 BPM | BODY MASS INDEX: 32.44 KG/M2

## 2024-05-01 DIAGNOSIS — J20.9 ACUTE BRONCHITIS, UNSPECIFIED ORGANISM: ICD-10-CM

## 2024-05-01 DIAGNOSIS — I10 ESSENTIAL HYPERTENSION: ICD-10-CM

## 2024-05-01 DIAGNOSIS — R06.02 SOB (SHORTNESS OF BREATH): ICD-10-CM

## 2024-05-01 DIAGNOSIS — M81.0 AGE-RELATED OSTEOPOROSIS WITHOUT CURRENT PATHOLOGICAL FRACTURE: ICD-10-CM

## 2024-05-01 DIAGNOSIS — E78.5 HYPERLIPIDEMIA, UNSPECIFIED HYPERLIPIDEMIA TYPE: ICD-10-CM

## 2024-05-01 DIAGNOSIS — E55.9 VITAMIN D DEFICIENCY: ICD-10-CM

## 2024-05-01 DIAGNOSIS — R73.09 ELEVATED GLUCOSE: ICD-10-CM

## 2024-05-01 DIAGNOSIS — I51.7 LVH (LEFT VENTRICULAR HYPERTROPHY): Primary | ICD-10-CM

## 2024-05-01 DIAGNOSIS — J45.909 ASTHMA DUE TO SEASONAL ALLERGIES: ICD-10-CM

## 2024-05-01 PROBLEM — L30.9 ECZEMA: Status: RESOLVED | Noted: 2020-09-09 | Resolved: 2024-05-01

## 2024-05-01 RX ORDER — MONTELUKAST SODIUM 10 MG/1
10 TABLET ORAL NIGHTLY
Qty: 90 TABLET | Refills: 1 | Status: SHIPPED | OUTPATIENT
Start: 2024-05-01 | End: 2024-07-30

## 2024-05-01 RX ORDER — PREDNISONE 20 MG/1
20 TABLET ORAL DAILY
Qty: 5 TABLET | Refills: 0 | Status: SHIPPED | OUTPATIENT
Start: 2024-05-01 | End: 2024-05-06

## 2024-05-01 RX ORDER — AZITHROMYCIN 250 MG/1
TABLET, FILM COATED ORAL
Qty: 6 TABLET | Refills: 0 | Status: SHIPPED | OUTPATIENT
Start: 2024-05-01 | End: 2024-05-11

## 2024-05-01 ASSESSMENT — ENCOUNTER SYMPTOMS
SORE THROAT: 1
CHEST TIGHTNESS: 0
ABDOMINAL PAIN: 0
RHINORRHEA: 1
COUGH: 1
NAUSEA: 0
ABDOMINAL DISTENTION: 0
BACK PAIN: 0
CONSTIPATION: 0
DIARRHEA: 0
COLOR CHANGE: 0
SHORTNESS OF BREATH: 1

## 2024-05-01 NOTE — PATIENT INSTRUCTIONS
Central scheduling number: [phone]- call and schedule echo can be done after June 1st     - get labs done a few days before annual wellness visit in November.  You will want to have nothing to eat or drink for 12 hours before completing labs, can have water.

## 2024-05-01 NOTE — PROGRESS NOTES
Geni Ndiaye, [facility]  [facility]  [address], [address]  [facility] [address]  Dept: [phone]  Dept Fax: [phone]     Patient ID: Isabel Colbert is a 78 y.o. female Established patient    HPI    Pt here today for f/u on chronic medical problems, go over labs and/or diagnostic studies, and medication refills. Pt denies any fever or chills.  Pt today denies chest pain  Pt denies any N/V/D/C or abdominal pain.    Pt has not been feeling well for the last 1-1.5 weeks she has had a horrible cough that causes SOB at times, sinus congestion, chills, fever, postnasal drip, runny nose, sore throat,and now headaches that came about 3 days ago. The cough is continuous and at times will cough up green stuff. Her  has not been feeling well either.     Otherwise pt doing well on current tx and no other concerns today.     The patient's past medical, surgical, social, and family history as well as his current medications and allergies were reviewed as documented in today's encounter by IVANA Patel.      Previous office notes, labs, imaging and hospital records were reviewed prior to and during encounter.    Current Outpatient Medications on File Prior to Visit   Medication Sig Dispense Refill    vitamin D (ERGOCALCIFEROL) 1.25 MG (96738 UT) CAPS capsule Take 1 capsule by mouth once a week 4 capsule 3    montelukast (SINGULAIR) 10 MG tablet Take 1 tablet by mouth nightly 30 tablet 0    ibuprofen (ADVIL;MOTRIN) 600 MG tablet Take 1 tablet by mouth 3 times daily as needed      Multiple Vitamins-Minerals (MULTIVITAMIN ADULT PO) Take 1 tablet by mouth daily      calcium carbonate (OSCAL) 500 MG TABS tablet Take 1 tablet by mouth daily      fluticasone (FLONASE) 50 MCG/ACT nasal spray 2 sprays by Nasal route daily 1 Bottle 0    Coenzyme Q10 (COQ10) 100 MG CAPS Take 1 capsule by mouth daily       loratadine (CLARITIN) 10 MG tablet Take 1 tablet by

## 2024-05-06 LAB — CYTOLOGY REPORT: NORMAL

## 2024-05-08 ENCOUNTER — TELEPHONE (OUTPATIENT)
Dept: FAMILY MEDICINE CLINIC | Age: 79
End: 2024-05-08

## 2024-05-08 DIAGNOSIS — J45.909 ASTHMA DUE TO SEASONAL ALLERGIES: Primary | ICD-10-CM

## 2024-05-08 RX ORDER — ALBUTEROL SULFATE 90 UG/1
2 AEROSOL, METERED RESPIRATORY (INHALATION) 4 TIMES DAILY PRN
Qty: 54 G | Refills: 1 | Status: SHIPPED | OUTPATIENT
Start: 2024-05-08

## 2024-05-08 NOTE — TELEPHONE ENCOUNTER
Thank you,tell her so glad she is feeling better and I sent over an albuterol inhaler that I would like her use as needed for the wheezing

## 2024-05-08 NOTE — TELEPHONE ENCOUNTER
Pt wanted to give PCP an update on the medications she has been taking. She feels they are helping. The coughing is much better but she still has wheezing only while laying down.

## 2024-06-13 ENCOUNTER — HOSPITAL ENCOUNTER (OUTPATIENT)
Dept: MAMMOGRAPHY | Age: 79
Discharge: HOME | End: 2024-06-15
Attending: OBSTETRICS & GYNECOLOGY
Payer: MEDICARE

## 2024-06-13 ENCOUNTER — HOSPITAL ENCOUNTER (OUTPATIENT)
Age: 79
Discharge: HOME | End: 2024-06-15
Attending: OBSTETRICS & GYNECOLOGY
Payer: MEDICARE

## 2024-06-13 VITALS — WEIGHT: 188 LBS | HEIGHT: 65 IN | BODY MASS INDEX: 31.32 KG/M2

## 2024-06-13 DIAGNOSIS — Z12.31 SCREENING MAMMOGRAM, ENCOUNTER FOR: ICD-10-CM

## 2024-06-13 DIAGNOSIS — N95.1 MENOPAUSAL STATE: ICD-10-CM

## 2024-06-13 DIAGNOSIS — Z13.820 SCREENING FOR OSTEOPOROSIS: ICD-10-CM

## 2024-06-13 DIAGNOSIS — R06.02 SOB (SHORTNESS OF BREATH): ICD-10-CM

## 2024-06-13 DIAGNOSIS — I51.7 LVH (LEFT VENTRICULAR HYPERTROPHY): ICD-10-CM

## 2024-06-13 DIAGNOSIS — Z78.0 ASYMPTOMATIC MENOPAUSAL STATE: ICD-10-CM

## 2024-06-13 LAB
ECHO AO ROOT DIAM: 3.1 CM
ECHO AO ROOT INDEX: 1.61 CM/M2
ECHO AV AREA PEAK VELOCITY: 3.1 CM2
ECHO AV AREA VTI: 3.6 CM2
ECHO AV AREA/BSA PEAK VELOCITY: 1.6 CM2/M2
ECHO AV AREA/BSA VTI: 1.9 CM2/M2
ECHO AV MEAN GRADIENT: 4 MMHG
ECHO AV MEAN VELOCITY: 0.9 M/S
ECHO AV PEAK GRADIENT: 6 MMHG
ECHO AV PEAK VELOCITY: 1.2 M/S
ECHO AV VELOCITY RATIO: 0.92
ECHO AV VTI: 30.9 CM
ECHO BSA: 1.98 M2
ECHO EST RA PRESSURE: 3 MMHG
ECHO IVC EXP: 1.5 CM
ECHO IVC INSP: 0.7 CM
ECHO LA AREA 2C: 18.3 CM2
ECHO LA AREA 4C: 17.4 CM2
ECHO LA DIAMETER: 4 CM
ECHO LA MAJOR AXIS: 5.1 CM
ECHO LA MINOR AXIS: 5.3 CM
ECHO LA TO AORTIC ROOT RATIO: 1.29
ECHO LA VOL BP: 51 ML (ref 22–52)
ECHO LA VOL MOD A2C: 54 ML (ref 22–52)
ECHO LA VOL MOD A4C: 48 ML (ref 22–52)
ECHO LA VOL/BSA BIPLANE: 26 ML/M2 (ref 16–34)
ECHO LA VOLUME INDEX MOD A2C: 28 ML/M2 (ref 16–34)
ECHO LA VOLUME INDEX MOD A4C: 25 ML/M2 (ref 16–34)
ECHO LV E' LATERAL VELOCITY: 5 CM/S
ECHO LV E' SEPTAL VELOCITY: 5 CM/S
ECHO LV FRACTIONAL SHORTENING: 19 % (ref 28–44)
ECHO LV INTERNAL DIMENSION DIASTOLE INDEX: 1.92 CM/M2
ECHO LV INTERNAL DIMENSION DIASTOLIC: 3.7 CM (ref 3.9–5.3)
ECHO LV INTERNAL DIMENSION SYSTOLIC INDEX: 1.55 CM/M2
ECHO LV INTERNAL DIMENSION SYSTOLIC: 3 CM
ECHO LV IVSD: 1.2 CM (ref 0.6–0.9)
ECHO LV MASS 2D: 138.2 G (ref 67–162)
ECHO LV MASS INDEX 2D: 71.6 G/M2 (ref 43–95)
ECHO LV POSTERIOR WALL DIASTOLIC: 1.1 CM (ref 0.6–0.9)
ECHO LV RELATIVE WALL THICKNESS RATIO: 0.59
ECHO LVOT AREA: 3.5 CM2
ECHO LVOT AV VTI INDEX: 1.03
ECHO LVOT DIAM: 2.1 CM
ECHO LVOT MEAN GRADIENT: 3 MMHG
ECHO LVOT PEAK GRADIENT: 4 MMHG
ECHO LVOT PEAK VELOCITY: 1.1 M/S
ECHO LVOT STROKE VOLUME INDEX: 56.9 ML/M2
ECHO LVOT SV: 109.7 ML
ECHO LVOT VTI: 31.7 CM
ECHO MV A VELOCITY: 0.94 M/S
ECHO MV E DECELERATION TIME (DT): 218 MS
ECHO MV E VELOCITY: 0.85 M/S
ECHO MV E/A RATIO: 0.9
ECHO MV E/E' LATERAL: 17
ECHO MV E/E' RATIO (AVERAGED): 17
ECHO MV E/E' SEPTAL: 17
ECHO RIGHT VENTRICULAR SYSTOLIC PRESSURE (RVSP): 28 MMHG
ECHO RV BASAL DIMENSION: 3.6 CM
ECHO RV FREE WALL PEAK S': 11 CM/S
ECHO TV REGURGITANT MAX VELOCITY: 2.5 M/S
ECHO TV REGURGITANT PEAK GRADIENT: 25 MMHG

## 2024-06-13 PROCEDURE — 93306 TTE W/DOPPLER COMPLETE: CPT

## 2024-06-13 PROCEDURE — 93306 TTE W/DOPPLER COMPLETE: CPT | Performed by: INTERNAL MEDICINE

## 2024-06-13 PROCEDURE — 77063 BREAST TOMOSYNTHESIS BI: CPT

## 2024-06-13 PROCEDURE — 77080 DXA BONE DENSITY AXIAL: CPT

## 2024-10-31 ENCOUNTER — HOSPITAL ENCOUNTER (OUTPATIENT)
Age: 79
Discharge: HOME | End: 2024-10-31
Payer: MEDICARE

## 2024-10-31 DIAGNOSIS — E55.9 VITAMIN D DEFICIENCY: ICD-10-CM

## 2024-10-31 DIAGNOSIS — E78.5 HYPERLIPIDEMIA, UNSPECIFIED HYPERLIPIDEMIA TYPE: ICD-10-CM

## 2024-10-31 DIAGNOSIS — R73.09 ELEVATED GLUCOSE: ICD-10-CM

## 2024-10-31 DIAGNOSIS — I10 ESSENTIAL HYPERTENSION: ICD-10-CM

## 2024-10-31 LAB
25(OH)D3 SERPL-MCNC: 43.5 NG/ML (ref 30–100)
ALBUMIN SERPL-MCNC: 4.1 G/DL (ref 3.5–5.2)
ALBUMIN/GLOB SERPL: 1.5 {RATIO} (ref 1–2.5)
ALP SERPL-CCNC: 84 U/L (ref 35–104)
ALT SERPL-CCNC: 15 U/L (ref 10–35)
ANION GAP SERPL CALCULATED.3IONS-SCNC: 11 MMOL/L (ref 9–16)
AST SERPL-CCNC: 22 U/L (ref 10–35)
BASOPHILS # BLD: 0.05 K/UL (ref 0–0.2)
BASOPHILS NFR BLD: 1 % (ref 0–2)
BILIRUB SERPL-MCNC: 0.6 MG/DL (ref 0–1.2)
BUN SERPL-MCNC: 18 MG/DL (ref 8–23)
CALCIUM SERPL-MCNC: 9.3 MG/DL (ref 8.6–10.4)
CHLORIDE SERPL-SCNC: 107 MMOL/L (ref 98–107)
CHOLEST SERPL-MCNC: 232 MG/DL (ref 0–199)
CHOLESTEROL/HDL RATIO: 3.6
CO2 SERPL-SCNC: 23 MMOL/L (ref 20–31)
CREAT SERPL-MCNC: 0.9 MG/DL (ref 0.6–0.9)
EOSINOPHIL # BLD: 0.06 K/UL (ref 0–0.44)
EOSINOPHILS RELATIVE PERCENT: 2 % (ref 1–4)
ERYTHROCYTE [DISTWIDTH] IN BLOOD BY AUTOMATED COUNT: 14.1 % (ref 11.8–14.4)
EST. AVERAGE GLUCOSE BLD GHB EST-MCNC: 117 MG/DL
GFR, ESTIMATED: 65 ML/MIN/1.73M2
GLUCOSE SERPL-MCNC: 101 MG/DL (ref 74–99)
HBA1C MFR BLD: 5.7 % (ref 4–6)
HCT VFR BLD AUTO: 41.6 % (ref 36.3–47.1)
HDLC SERPL-MCNC: 64 MG/DL
HGB BLD-MCNC: 13.9 G/DL (ref 11.9–15.1)
IMM GRANULOCYTES # BLD AUTO: <0.03 K/UL (ref 0–0.3)
IMM GRANULOCYTES NFR BLD: 0 %
LDLC SERPL CALC-MCNC: 145 MG/DL (ref 0–100)
LYMPHOCYTES NFR BLD: 1.47 K/UL (ref 1.1–3.7)
LYMPHOCYTES RELATIVE PERCENT: 37 % (ref 24–43)
MCH RBC QN AUTO: 29.6 PG (ref 25.2–33.5)
MCHC RBC AUTO-ENTMCNC: 33.4 G/DL (ref 28.4–34.8)
MCV RBC AUTO: 88.7 FL (ref 82.6–102.9)
MONOCYTES NFR BLD: 0.43 K/UL (ref 0.1–1.2)
MONOCYTES NFR BLD: 11 % (ref 3–12)
NEUTROPHILS NFR BLD: 49 % (ref 36–65)
NEUTS SEG NFR BLD: 2 K/UL (ref 1.5–8.1)
NRBC BLD-RTO: 0 PER 100 WBC
PLATELET # BLD AUTO: 224 K/UL (ref 138–453)
PMV BLD AUTO: 9.9 FL (ref 8.1–13.5)
POTASSIUM SERPL-SCNC: 4.1 MMOL/L (ref 3.7–5.3)
PROT SERPL-MCNC: 6.8 G/DL (ref 6.6–8.7)
RBC # BLD AUTO: 4.69 M/UL (ref 3.95–5.11)
SODIUM SERPL-SCNC: 141 MMOL/L (ref 136–145)
TRIGL SERPL-MCNC: 113 MG/DL
VLDLC SERPL CALC-MCNC: 23 MG/DL (ref 1–30)
WBC OTHER # BLD: 4 K/UL (ref 3.5–11.3)

## 2024-10-31 PROCEDURE — 80061 LIPID PANEL: CPT

## 2024-10-31 PROCEDURE — 80053 COMPREHEN METABOLIC PANEL: CPT

## 2024-10-31 PROCEDURE — 83036 HEMOGLOBIN GLYCOSYLATED A1C: CPT

## 2024-10-31 PROCEDURE — 36415 COLL VENOUS BLD VENIPUNCTURE: CPT

## 2024-10-31 PROCEDURE — 85025 COMPLETE CBC W/AUTO DIFF WBC: CPT

## 2024-10-31 PROCEDURE — 82306 VITAMIN D 25 HYDROXY: CPT

## 2024-11-03 SDOH — ECONOMIC STABILITY: FOOD INSECURITY: WITHIN THE PAST 12 MONTHS, YOU WORRIED THAT YOUR FOOD WOULD RUN OUT BEFORE YOU GOT MONEY TO BUY MORE.: NEVER TRUE

## 2024-11-03 SDOH — ECONOMIC STABILITY: FOOD INSECURITY: WITHIN THE PAST 12 MONTHS, THE FOOD YOU BOUGHT JUST DIDN'T LAST AND YOU DIDN'T HAVE MONEY TO GET MORE.: NEVER TRUE

## 2024-11-03 SDOH — ECONOMIC STABILITY: TRANSPORTATION INSECURITY
IN THE PAST 12 MONTHS, HAS LACK OF TRANSPORTATION KEPT YOU FROM MEETINGS, WORK, OR FROM GETTING THINGS NEEDED FOR DAILY LIVING?: NO

## 2024-11-03 SDOH — ECONOMIC STABILITY: INCOME INSECURITY: HOW HARD IS IT FOR YOU TO PAY FOR THE VERY BASICS LIKE FOOD, HOUSING, MEDICAL CARE, AND HEATING?: NOT HARD AT ALL

## 2024-11-03 SDOH — HEALTH STABILITY: PHYSICAL HEALTH: ON AVERAGE, HOW MANY DAYS PER WEEK DO YOU ENGAGE IN MODERATE TO STRENUOUS EXERCISE (LIKE A BRISK WALK)?: 1 DAY

## 2024-11-03 SDOH — HEALTH STABILITY: PHYSICAL HEALTH: ON AVERAGE, HOW MANY MINUTES DO YOU ENGAGE IN EXERCISE AT THIS LEVEL?: 20 MIN

## 2024-11-03 ASSESSMENT — PATIENT HEALTH QUESTIONNAIRE - PHQ9
1. LITTLE INTEREST OR PLEASURE IN DOING THINGS: NOT AT ALL
2. FEELING DOWN, DEPRESSED OR HOPELESS: NOT AT ALL
SUM OF ALL RESPONSES TO PHQ QUESTIONS 1-9: 0
SUM OF ALL RESPONSES TO PHQ9 QUESTIONS 1 & 2: 0
SUM OF ALL RESPONSES TO PHQ QUESTIONS 1-9: 0

## 2024-11-03 ASSESSMENT — LIFESTYLE VARIABLES
HOW MANY STANDARD DRINKS CONTAINING ALCOHOL DO YOU HAVE ON A TYPICAL DAY: 1
HOW OFTEN DO YOU HAVE A DRINK CONTAINING ALCOHOL: 2-4 TIMES A MONTH
HOW OFTEN DO YOU HAVE A DRINK CONTAINING ALCOHOL: 3
HOW MANY STANDARD DRINKS CONTAINING ALCOHOL DO YOU HAVE ON A TYPICAL DAY: 1 OR 2
HOW OFTEN DO YOU HAVE SIX OR MORE DRINKS ON ONE OCCASION: 1

## 2024-11-04 ENCOUNTER — OFFICE VISIT (OUTPATIENT)
Dept: FAMILY MEDICINE CLINIC | Age: 79
End: 2024-11-04

## 2024-11-04 VITALS
TEMPERATURE: 98 F | HEIGHT: 65 IN | OXYGEN SATURATION: 95 % | RESPIRATION RATE: 16 BRPM | DIASTOLIC BLOOD PRESSURE: 88 MMHG | HEART RATE: 72 BPM | BODY MASS INDEX: 32.15 KG/M2 | WEIGHT: 193 LBS | SYSTOLIC BLOOD PRESSURE: 130 MMHG

## 2024-11-04 DIAGNOSIS — E78.5 HYPERLIPIDEMIA, UNSPECIFIED HYPERLIPIDEMIA TYPE: ICD-10-CM

## 2024-11-04 DIAGNOSIS — I51.7 LVH (LEFT VENTRICULAR HYPERTROPHY): ICD-10-CM

## 2024-11-04 DIAGNOSIS — Z23 FLU VACCINE NEED: ICD-10-CM

## 2024-11-04 DIAGNOSIS — I10 ESSENTIAL HYPERTENSION: ICD-10-CM

## 2024-11-04 DIAGNOSIS — Z00.00 MEDICARE ANNUAL WELLNESS VISIT, SUBSEQUENT: Primary | ICD-10-CM

## 2024-11-04 DIAGNOSIS — E55.9 VITAMIN D DEFICIENCY: ICD-10-CM

## 2024-11-04 DIAGNOSIS — M81.0 AGE-RELATED OSTEOPOROSIS WITHOUT CURRENT PATHOLOGICAL FRACTURE: ICD-10-CM

## 2024-11-04 DIAGNOSIS — J45.909 ASTHMA DUE TO SEASONAL ALLERGIES: ICD-10-CM

## 2024-11-04 RX ORDER — SIMVASTATIN 20 MG
20 TABLET ORAL NIGHTLY
Qty: 90 TABLET | Refills: 1 | Status: SHIPPED | OUTPATIENT
Start: 2024-11-04

## 2024-11-04 NOTE — PROGRESS NOTES
ARIEL Daniels-CNP  [facility]  [address], [address]  [facility] [address]  Dept: [phone]  Dept Fax: [phone]  Medicare Annual Wellness Visit    Isabel Colbert is here for Medicare AWV    Assessment & Plan   Medicare annual wellness visit, subsequent  - up to date on all preventative care.     Flu vaccine need  After obtaining informed consent, the immunization is given by IVANA Patel..  -     Influenza, FLUAD Trivalent, (age 65 y+), IM, Preservative Free, 0.5mL     Essential hypertension  LVH (left ventricular hypertrophy)  - Stable: Medication re-filled as needed, con't medications as prescribed, con't current tx plan  - Continue HCTZ as previously prescribed  - echo was ordered 1/2023, pt was not able to get completed. Order placed.   - Education provided on maintaining a low sodium diet and routine exercise.  - Advised to seek emergent tx if SBP>180 and/or DBP>110, any chest pain, h/a or vision changes     Vitamin D deficiency  - due for labs, continue to increase vitamin D intake.      Hyperlipidemia, unspecified hyperlipidemia type  - discussed ASCVD risk score with pt, will start low dose simvastatin 20 mg daily.   - Lifestyle changes: Decrease fats, sugars, carbohydrates, and increase routine exercise, try to get 150 minutes of aerobic activity a week and watch calorie portions and to limit her carbs in her diet.  - Foods that helps increase HDL include the following: Fish, nuts, flax, avocados, and legumes (such as soy, kidney beans, chickpeas).  - Will cont with low fat/chol diet   The 10-year ASCVD risk score (Yokasta OROZCO, et al., 2019) is: 25.1%    Values used to calculate the score:      Age: 78 years      Sex: Female      Is Non- : No      Diabetic: No      Tobacco smoker: No      Systolic Blood Pressure: 130 mmHg      Is BP treated: No      HDL Cholesterol: 64 mg/dL      Total Cholesterol: 232 mg/dL

## 2024-11-07 DIAGNOSIS — J45.909 ASTHMA DUE TO SEASONAL ALLERGIES: ICD-10-CM

## 2024-11-07 RX ORDER — MONTELUKAST SODIUM 10 MG/1
10 TABLET ORAL NIGHTLY
Qty: 90 TABLET | Refills: 1 | Status: SHIPPED | OUTPATIENT
Start: 2024-11-07

## 2025-01-20 SDOH — ECONOMIC STABILITY: FOOD INSECURITY: WITHIN THE PAST 12 MONTHS, YOU WORRIED THAT YOUR FOOD WOULD RUN OUT BEFORE YOU GOT MONEY TO BUY MORE.: NEVER TRUE

## 2025-01-20 SDOH — ECONOMIC STABILITY: FOOD INSECURITY: WITHIN THE PAST 12 MONTHS, THE FOOD YOU BOUGHT JUST DIDN'T LAST AND YOU DIDN'T HAVE MONEY TO GET MORE.: NEVER TRUE

## 2025-01-20 SDOH — ECONOMIC STABILITY: INCOME INSECURITY: IN THE LAST 12 MONTHS, WAS THERE A TIME WHEN YOU WERE NOT ABLE TO PAY THE MORTGAGE OR RENT ON TIME?: NO

## 2025-01-20 SDOH — ECONOMIC STABILITY: TRANSPORTATION INSECURITY
IN THE PAST 12 MONTHS, HAS THE LACK OF TRANSPORTATION KEPT YOU FROM MEDICAL APPOINTMENTS OR FROM GETTING MEDICATIONS?: NO

## 2025-01-20 ASSESSMENT — PATIENT HEALTH QUESTIONNAIRE - PHQ9
SUM OF ALL RESPONSES TO PHQ QUESTIONS 1-9: 0
1. LITTLE INTEREST OR PLEASURE IN DOING THINGS: NOT AT ALL
1. LITTLE INTEREST OR PLEASURE IN DOING THINGS: NOT AT ALL
SUM OF ALL RESPONSES TO PHQ QUESTIONS 1-9: 0
SUM OF ALL RESPONSES TO PHQ9 QUESTIONS 1 & 2: 0
SUM OF ALL RESPONSES TO PHQ9 QUESTIONS 1 & 2: 0
2. FEELING DOWN, DEPRESSED OR HOPELESS: NOT AT ALL
2. FEELING DOWN, DEPRESSED OR HOPELESS: NOT AT ALL

## 2025-01-23 ENCOUNTER — OFFICE VISIT (OUTPATIENT)
Dept: FAMILY MEDICINE CLINIC | Age: 80
End: 2025-01-23

## 2025-01-23 VITALS
OXYGEN SATURATION: 97 % | TEMPERATURE: 97.6 F | RESPIRATION RATE: 16 BRPM | DIASTOLIC BLOOD PRESSURE: 88 MMHG | BODY MASS INDEX: 32.28 KG/M2 | HEART RATE: 82 BPM | SYSTOLIC BLOOD PRESSURE: 134 MMHG | WEIGHT: 194 LBS

## 2025-01-23 DIAGNOSIS — M54.2 CERVICAL PAIN: ICD-10-CM

## 2025-01-23 DIAGNOSIS — E55.9 VITAMIN D DEFICIENCY: ICD-10-CM

## 2025-01-23 DIAGNOSIS — R20.0 NUMBNESS AND TINGLING OF BOTH UPPER EXTREMITIES: ICD-10-CM

## 2025-01-23 DIAGNOSIS — R73.09 ELEVATED GLUCOSE: ICD-10-CM

## 2025-01-23 DIAGNOSIS — E78.5 HYPERLIPIDEMIA, UNSPECIFIED HYPERLIPIDEMIA TYPE: ICD-10-CM

## 2025-01-23 DIAGNOSIS — R20.2 NUMBNESS AND TINGLING OF BOTH UPPER EXTREMITIES: ICD-10-CM

## 2025-01-23 DIAGNOSIS — M81.0 AGE-RELATED OSTEOPOROSIS WITHOUT CURRENT PATHOLOGICAL FRACTURE: ICD-10-CM

## 2025-01-23 DIAGNOSIS — J45.909 ASTHMA DUE TO SEASONAL ALLERGIES: ICD-10-CM

## 2025-01-23 DIAGNOSIS — I10 ESSENTIAL HYPERTENSION: Primary | ICD-10-CM

## 2025-01-23 PROBLEM — H52.4 PRESBYOPIA: Status: RESOLVED | Noted: 2024-01-05 | Resolved: 2025-01-23

## 2025-01-23 PROBLEM — N95.9 PERIMENOPAUSAL DISORDER: Status: RESOLVED | Noted: 2020-09-09 | Resolved: 2025-01-23

## 2025-01-23 PROBLEM — I65.23 BILATERAL CAROTID ARTERY STENOSIS: Status: RESOLVED | Noted: 2019-10-18 | Resolved: 2025-01-23

## 2025-01-23 PROBLEM — N95.2 VAGINAL ATROPHY: Status: RESOLVED | Noted: 2024-04-25 | Resolved: 2025-01-23

## 2025-01-23 PROBLEM — M51.369 LUMBAR DEGENERATIVE DISC DISEASE: Status: RESOLVED | Noted: 2019-12-13 | Resolved: 2025-01-23

## 2025-01-23 PROBLEM — Z96.1 PSEUDOPHAKIA OF BOTH EYES: Status: RESOLVED | Noted: 2024-01-05 | Resolved: 2025-01-23

## 2025-01-23 PROBLEM — R76.8 POSITIVE ANA (ANTINUCLEAR ANTIBODY): Status: RESOLVED | Noted: 2020-07-13 | Resolved: 2025-01-23

## 2025-01-23 PROBLEM — H04.19 DISORDER OF LACRIMAL GLAND: Status: RESOLVED | Noted: 2024-01-05 | Resolved: 2025-01-23

## 2025-01-23 PROBLEM — I51.7 LVH (LEFT VENTRICULAR HYPERTROPHY): Status: RESOLVED | Noted: 2019-12-04 | Resolved: 2025-01-23

## 2025-01-23 PROBLEM — N83.202 CYST OF LEFT OVARY: Status: RESOLVED | Noted: 2020-09-09 | Resolved: 2025-01-23

## 2025-01-23 PROBLEM — M85.80 OSTEOPENIA: Status: RESOLVED | Noted: 2020-09-09 | Resolved: 2025-01-23

## 2025-01-23 PROBLEM — H43.811 DEGENERATION OF POSTERIOR VITREOUS BODY OF RIGHT EYE: Status: RESOLVED | Noted: 2024-01-05 | Resolved: 2025-01-23

## 2025-01-23 ASSESSMENT — ENCOUNTER SYMPTOMS
CHEST TIGHTNESS: 0
COUGH: 1
NAUSEA: 0
BACK PAIN: 0
RHINORRHEA: 0
SORE THROAT: 0
ABDOMINAL DISTENTION: 0
CONSTIPATION: 0
ABDOMINAL PAIN: 0
SHORTNESS OF BREATH: 0
COLOR CHANGE: 0
DIARRHEA: 0

## 2025-01-23 NOTE — PROGRESS NOTES
Geni Ndiaye, APRN-CNP  [facility]  [facility]IC[facility]  [address], [address]  [facility] [address]  Dept: [phone]  Dept Fax: [phone]     Patient ID: Isabel Colbert is a 79 y.o. female Established patient      HPI    Pt here today for f/u on chronic medical problems, go over labs and/or diagnostic studies, and medication refills. Pt denies any fever or chills.  Pt today denies any HA, chest pain, or SOB.  Pt denies any N/V/D/C or abdominal pain.    - she is having a lot of sinus congestion and cough recently, she has not done the flonase and claritin. She does feel the singulair has helped with the chronic cough.   - she is having some cervical tightness and discomfort that is causing some pain to bilateral arms with numbness and tingling. She finds that she has trouble with her  and afraid she is going to drop objects. She has hx of cervical fusion.     Otherwise pt doing well on current tx and no other concerns today.     The patient's past medical, surgical, social, and family history as well as his current medications and allergies were reviewed as documented in today's encounter by IVANA Patel.      Previous office notes, labs, imaging and hospital records were reviewed prior to and during encounter.    Current Outpatient Medications on File Prior to Visit   Medication Sig Dispense Refill    montelukast (SINGULAIR) 10 MG tablet Take 1 tablet by mouth nightly 90 tablet 1    simvastatin (ZOCOR) 20 MG tablet Take 1 tablet by mouth nightly 90 tablet 1    albuterol sulfate HFA (VENTOLIN HFA) 108 (90 Base) MCG/ACT inhaler Inhale 2 puffs into the lungs 4 times daily as needed for Wheezing 54 g 1    vitamin D (ERGOCALCIFEROL) 1.25 MG (53126 UT) CAPS capsule Take 1 capsule by mouth once a week 4 capsule 3    ibuprofen (ADVIL;MOTRIN) 600 MG tablet Take 1 tablet by mouth 3 times daily as needed      Multiple Vitamins-Minerals (MULTIVITAMIN ADULT PO) Take 1

## 2025-02-21 DIAGNOSIS — J45.909 ASTHMA DUE TO SEASONAL ALLERGIES: ICD-10-CM

## 2025-02-21 DIAGNOSIS — E78.5 HYPERLIPIDEMIA, UNSPECIFIED HYPERLIPIDEMIA TYPE: ICD-10-CM

## 2025-02-21 RX ORDER — SIMVASTATIN 20 MG
20 TABLET ORAL NIGHTLY
Qty: 90 TABLET | Refills: 1 | Status: SHIPPED | OUTPATIENT
Start: 2025-02-21

## 2025-02-21 RX ORDER — MONTELUKAST SODIUM 10 MG/1
10 TABLET ORAL NIGHTLY
Qty: 90 TABLET | Refills: 1 | Status: SHIPPED | OUTPATIENT
Start: 2025-02-21

## 2025-03-11 ENCOUNTER — TELEPHONE (OUTPATIENT)
Dept: FAMILY MEDICINE CLINIC | Age: 80
End: 2025-03-11

## 2025-03-11 ENCOUNTER — HOSPITAL ENCOUNTER (OUTPATIENT)
Dept: PHYSICAL THERAPY | Facility: CLINIC | Age: 80
Setting detail: THERAPIES SERIES
Discharge: HOME | End: 2025-03-11
Payer: MEDICARE

## 2025-03-11 DIAGNOSIS — G56.00 CARPAL TUNNEL SYNDROME, UNSPECIFIED LATERALITY: Primary | ICD-10-CM

## 2025-03-11 PROCEDURE — 97162 PT EVAL MOD COMPLEX 30 MIN: CPT

## 2025-03-11 PROCEDURE — 97110 THERAPEUTIC EXERCISES: CPT

## 2025-03-11 NOTE — TELEPHONE ENCOUNTER
Pt will reach out to a friend to see if he does Carpal tunnel or if not who they recommend. Pt will let us know.

## 2025-03-11 NOTE — TELEPHONE ENCOUNTER
Kristina from [facility] called in regarding patient.  Kristina stated that patient was being treated for neck problems and numbness/weakness in her hands.  Kristina states that patient may have carpel tunnel issues and wanted to see if PCP would want to refer her to specialist for carpal tunnel.  Please advise.    Phone: [phone]

## 2025-03-11 NOTE — TELEPHONE ENCOUNTER
Patient came into [place] office wanting to give the name of the physician for carpal tunnel referral.    Abhay Correa  [facility]    [address]  [address]  [place] [address]    Phone: [phone]  Fax: [phone]

## 2025-03-11 NOTE — CONSULTS
[] Assist [] Assist    Feeding [] Assist [] Assist    Toileting [] Assist [] Assist    Driving [] Assist [] Assist    Housekeeping [] Assist [] Assist    Grocery shop/meal prep [] Assist [x] Assist Opening jars     Gait Prior level of function Current level of function    [x] Independent  [] Assist [x] Independent  [] Assist   Device: [x] Independent [x] Independent    [] Straight Cane [] Quad cane [] Straight Cane [] Quad cane    [] Standard walker [] Rolling walker   [] 4 wheeled walker [] Standard walker [] Rolling walker   [] 4 wheeled walker    [] Wheelchair [] Wheelchair         Objective:    OBSERVATION No Deficit Deficit Not Tested Comments   Posture       Forward Head [] [x] []    Rounded Shoulders [] [x] []    Kyphosis [] [x] []    Palpation [] [] [] No tenderness cervical/upper thoracic   Sensation [] [x] [] Bilateral index, long , and thumb/palm of hand   Edema [] [x] [] Mild swelling bilateral hands/wrists   Neurological [x] [] [] UE reflexes 2+         STRENGTH  ROM    Left Right Cervical No pain or reproduction of symptoms with cervical ROM   C5 Shld Abd   Flexion WNL   Shld Flexion 4 4 Extension 10% limit   Shld IR 4+ 4+ Rotation L  10% limit R 10% limit   Shld ER 4+ 4+ Sidebend L R   C6 Elb Flex 5 5 Retraction    C7 Elb Ext 5 5     C8 EPL 5 5     Abductor pollices brevis 3+ 3+     FPB 3+ 3+     Opponens pollicis 3- 3+     T1 Fing Abd 4+ 4+ UE WFL limited PIP and DIP flexion bilateral hands                     Mild      TESTS (+/-) LEFT RIGHT   Phalen test + +   Tinel's  + +         Functional Test: NPDI  Score: Raw score of 14/50,   which is 28% functionally impaired                                        Fragoso Fall Risk Assessment      Risk Factor Scale  Score   History of Falls [] Yes  [x] No 25  0 0   Secondary Diagnosis [] Yes  [x] No 15  0 0   Ambulatory Aid [] Furniture  [] Crutches/cane/walker  [x] None/bedrest/wheelchair/nurse 30  15  0 0   IV/Heparin Lock [] Yes  [x] No 20  0 0

## 2025-03-11 NOTE — TELEPHONE ENCOUNTER
It looks like she has seen Dr. Sherwood in the past. Can we call her and see if she would like to return back to him?

## 2025-03-14 ENCOUNTER — TELEPHONE (OUTPATIENT)
Dept: FAMILY MEDICINE CLINIC | Age: 80
End: 2025-03-14

## 2025-03-14 NOTE — TELEPHONE ENCOUNTER
Patient called in stating that she hasn't been feeling well for about 2 weeks now.  Patient states she has been taking Zyrtec, nasal spray, and using her inhaler to help with symptoms.  Please advise.    Symptoms:    Cough  Congestion  Nasal drainage  Headaches

## 2025-03-14 NOTE — TELEPHONE ENCOUNTER
Patient scheduled for an appointment. Recommended urgent care if she got worse over the weekend.

## 2025-03-15 NOTE — PROGRESS NOTES
spray 2 sprays by Nasal route daily 1 Bottle 0    Coenzyme Q10 (COQ10) 100 MG CAPS Take 1 capsule by mouth daily       loratadine (CLARITIN) 10 MG tablet Take 1 tablet by mouth daily as needed (Seasonal allergies)      Biotin 1000 MCG TABS Take 1,000 mcg by mouth daily        No current facility-administered medications on file prior to visit.        Subjective:     Review of Systems   Constitutional:  Positive for fatigue. Negative for activity change and fever.   HENT:  Positive for congestion, postnasal drip, rhinorrhea, sinus pressure, sinus pain and sore throat. Negative for ear pain.    Respiratory:  Positive for cough. Negative for chest tightness and shortness of breath.    Cardiovascular:  Negative for chest pain and palpitations.   Gastrointestinal:  Negative for abdominal distention, abdominal pain, constipation, diarrhea and nausea.   Endocrine: Negative for polydipsia, polyphagia and polyuria.   Genitourinary:  Negative for difficulty urinating and dysuria.   Musculoskeletal:  Negative for arthralgias, back pain and myalgias.   Skin:  Negative for color change and rash.   Neurological:  Positive for headaches. Negative for dizziness, weakness and light-headedness.   Hematological:  Negative for adenopathy.   Psychiatric/Behavioral:  Negative for agitation and behavioral problems. The patient is not nervous/anxious.      Vitals:    03/18/25 0841   BP: 134/86   Pulse: 84   Resp: 16   Temp: 97 °F (36.1 °C)   SpO2: 98%       Objective:     Physical Exam  Vitals reviewed.   Constitutional:       Appearance: Normal appearance. She is not ill-appearing.   HENT:      Head: Normocephalic.      Right Ear: Hearing, tympanic membrane, ear canal and external ear normal.      Left Ear: Hearing, tympanic membrane, ear canal and external ear normal.      Nose:      Right Turbinates: Swollen.      Left Turbinates: Swollen.      Right Sinus: Maxillary sinus tenderness and frontal sinus tenderness present.      Left

## 2025-03-18 ENCOUNTER — OFFICE VISIT (OUTPATIENT)
Dept: FAMILY MEDICINE CLINIC | Age: 80
End: 2025-03-18

## 2025-03-18 VITALS
SYSTOLIC BLOOD PRESSURE: 134 MMHG | HEART RATE: 84 BPM | DIASTOLIC BLOOD PRESSURE: 86 MMHG | WEIGHT: 190 LBS | OXYGEN SATURATION: 98 % | RESPIRATION RATE: 16 BRPM | TEMPERATURE: 97 F | BODY MASS INDEX: 31.62 KG/M2

## 2025-03-18 DIAGNOSIS — B96.89 ACUTE BACTERIAL SINUSITIS: Primary | ICD-10-CM

## 2025-03-18 DIAGNOSIS — J20.9 ACUTE BRONCHITIS, UNSPECIFIED ORGANISM: ICD-10-CM

## 2025-03-18 DIAGNOSIS — J01.90 ACUTE BACTERIAL SINUSITIS: Primary | ICD-10-CM

## 2025-03-18 RX ORDER — PREDNISONE 20 MG/1
20 TABLET ORAL DAILY
Qty: 5 TABLET | Refills: 0 | Status: SHIPPED | OUTPATIENT
Start: 2025-03-18 | End: 2025-03-23

## 2025-03-18 RX ORDER — AZITHROMYCIN 250 MG/1
TABLET, FILM COATED ORAL
Qty: 6 TABLET | Refills: 0 | Status: SHIPPED | OUTPATIENT
Start: 2025-03-18 | End: 2025-03-28

## 2025-03-18 ASSESSMENT — ENCOUNTER SYMPTOMS
SINUS PRESSURE: 1
NAUSEA: 0
SINUS PAIN: 1
SHORTNESS OF BREATH: 0
COLOR CHANGE: 0
DIARRHEA: 0
ABDOMINAL PAIN: 0
COUGH: 1
CONSTIPATION: 0
SORE THROAT: 1
CHEST TIGHTNESS: 0
ABDOMINAL DISTENTION: 0
RHINORRHEA: 1
BACK PAIN: 0

## 2025-03-20 ENCOUNTER — HOSPITAL ENCOUNTER (OUTPATIENT)
Dept: PHYSICAL THERAPY | Facility: CLINIC | Age: 80
Setting detail: THERAPIES SERIES
Discharge: HOME | End: 2025-03-20
Payer: MEDICARE

## 2025-03-20 PROCEDURE — 97110 THERAPEUTIC EXERCISES: CPT

## 2025-03-20 NOTE — FLOWSHEET NOTE
Postural Deviations  [] Gait Deviations  [] Other:              STG: (to be met in 8 treatments)  ? Pain: hands and UE to 4/10 at worst to improve sleep  ? Strength: bilateral thumb abduction, flexion and opposition to 4/5 to improve use fo her hands  Patient to be independent with home exercise program as demonstrated by performance with correct form without cues.  Demonstrate Knowledge of fall prevention  LTG: (to be met in 15 treatments)  NPDI 15% or less impaired           Patient goals: \"Regain feeling and strength in my hands\"    Pt. Education:  [x] Yes  [] No  [x] Reviewed Prior HEP/Ed  Method of Education: [x] Verbal  [x] Demo  [x] Written  Comprehension of Education:  [x] Verbalizes understanding.  [x] Demonstrates understanding.  [x] Needs review.  [] Demonstrates/verbalizes HEP/Ed previously given.     URL: [email or web address]  Date: 03/20/2025  Prepared by: Ilene Doll  - Wrist Tendon Gliding  - 1 x daily - 3-5 reps  - Thumb Opposition  - 1 x daily - 5 reps  - Seated Scapular Retraction  - 1 x daily - 5-10 reps - 5 seconds hold  - Single Arm Doorway Pec Stretch at 60 Elevation  - 1 x daily - 3 reps - 20-30 seconds hold  - Putty Squeezes  - 3 x weekly - 10 reps  - Finger Key  with Putty  - 3 x weekly - 5-10 reps  - Finger Extension with Putty  - 3 x weekly - 5-10 reps  - Thumb Opposition with Putty  - 3 x weekly - 5-10 reps    Plan: [x] Continue current frequency toward long and short term goals.    [] Specific Instructions for subsequent treatments: see above      Time In:1005            Time Out: 1048    Electronically signed by:  Ilene Gee, PT

## 2025-04-02 ENCOUNTER — HOSPITAL ENCOUNTER (OUTPATIENT)
Dept: PHYSICAL THERAPY | Facility: CLINIC | Age: 80
Setting detail: THERAPIES SERIES
Discharge: HOME | End: 2025-04-02
Payer: MEDICARE

## 2025-04-02 PROCEDURE — 97110 THERAPEUTIC EXERCISES: CPT

## 2025-04-02 NOTE — FLOWSHEET NOTE
[] [facility]  Outpatient Rehabilitation &  Therapy  [address]  [phone]  [phone] [] [facility]  Outpatient Rehabilitation &  Therapy  [address]  P: [phone]  F: [phone] [x] [facility]  Outpatient Rehabilitation &  Therapy  [address]  P: [phone]  F: [phone] [] [facility]  Outpatient Rehabilitation &  Therapy  [address]  [phone]  [phone] [] [facility]  Outpatient Rehabilitation &  Therapy  [address]   P: [phone]  F: [phone]  [] [facility]  Outpatient Rehabilitation &  Therapy  [address]  P: [phone]  F: [phone] [] [facility]  Outpatient Rehabilitation &  Therapy  [address]  P: [phone]  F: [phone] [] [facility]  Outpatient Rehabilitation &  Therapy  [address]  P: [phone]  F: [phone] [] [facility]  Outpatient Rehabilitation &  Therapy  [address]  P: [phone]  F: [phone]  [] [facility] Outpatient Rehabilitation &  Therapy  [address]  P: [phone]  F: [phone]     Physical Therapy Daily Treatment Note    Date:  2025  Patient Name:  Isabel Colbert    :  1945  MRN: [id number]  Physician: Geni Ndiaye APRN - CNP                         Insurance: [employer] Medicare, visits bmn, no auth req, 4% co-ins, oop 1500/1500 rem. CPT codes verified: 95083, 78559, 60680, 71336 Excluded: ,   Medical Diagnosis: Cervical pain (M54.2)  Numbness and tingling of both upper extremities (R20.0,R20.2)                    Rehab Codes: M79.64  Onset Date: 3/11/2023                      Next 's appt.: 25  Visit# / total visits: 3/15; Progress note for Medicare patient due at visit

## 2025-05-23 DIAGNOSIS — J45.909 ASTHMA DUE TO SEASONAL ALLERGIES: ICD-10-CM

## 2025-05-23 DIAGNOSIS — E78.5 HYPERLIPIDEMIA, UNSPECIFIED HYPERLIPIDEMIA TYPE: ICD-10-CM

## 2025-05-23 RX ORDER — SIMVASTATIN 20 MG
20 TABLET ORAL NIGHTLY
Qty: 90 TABLET | Refills: 1 | Status: SHIPPED | OUTPATIENT
Start: 2025-05-23

## 2025-05-23 RX ORDER — MONTELUKAST SODIUM 10 MG/1
10 TABLET ORAL NIGHTLY
Qty: 90 TABLET | Refills: 1 | Status: SHIPPED | OUTPATIENT
Start: 2025-05-23

## 2025-06-06 ENCOUNTER — CLINICAL DOCUMENTATION (OUTPATIENT)
Dept: PHYSICAL THERAPY | Facility: CLINIC | Age: 80
End: 2025-06-06

## 2025-06-06 NOTE — THERAPY DISCHARGE
[] Vasocompression/    [] Other:             Game Ready    Discharge Status:     [] Pt recovered from conditions. Treatment goals were met.    [] Pt received maximum benefit. No further therapy indicated at this time.    [x] Pt to continue exercise/home instructions independently.    [] Therapy interrupted due to:    [] Pt has 2 or more no shows/cancels, is discontinued per our policy.    [] Pt has completed prescribed number of treatment sessions.    [x] Other: Patient scheduled for further workup for carpel tunnel.        Electronically signed by Ilene Gee PT on 6/6/2025 at 2:16 PM      If you have any questions or concerns, please don't hesitate to call.  Thank you for your referral.